# Patient Record
Sex: MALE | Race: WHITE | Employment: FULL TIME | URBAN - METROPOLITAN AREA
[De-identification: names, ages, dates, MRNs, and addresses within clinical notes are randomized per-mention and may not be internally consistent; named-entity substitution may affect disease eponyms.]

---

## 2021-07-01 ENCOUNTER — TELEPHONE (OUTPATIENT)
Dept: ADMINISTRATIVE | Age: 81
End: 2021-07-01

## 2021-07-01 NOTE — TELEPHONE ENCOUNTER
Discussed with Dr. Riley Lobo. Call placed to patient. Appointment scheduled at this time. Directions provided at this time.      Future Appointments   Date Time Provider Rowena Madsen   7/2/2021 11:00 AM SCHEDULE, SE ORTHO RES  [facility]

## 2021-07-02 ENCOUNTER — PREP FOR PROCEDURE (OUTPATIENT)
Dept: ORTHOPEDIC SURGERY | Age: 81
End: 2021-07-02

## 2021-07-02 ENCOUNTER — OFFICE VISIT (OUTPATIENT)
Dept: ORTHOPEDIC SURGERY | Age: 81
End: 2021-07-02
Payer: MEDICARE

## 2021-07-02 VITALS — TEMPERATURE: 97.1 F

## 2021-07-02 DIAGNOSIS — S66.822A LACERATION OF HAND INVOLVING EXTENSOR TENDON, LEFT, INITIAL ENCOUNTER: Primary | ICD-10-CM

## 2021-07-02 DIAGNOSIS — S61.412A LACERATION OF HAND INVOLVING EXTENSOR TENDON, LEFT, INITIAL ENCOUNTER: Primary | ICD-10-CM

## 2021-07-02 PROBLEM — S66.829A LACERATION OF HAND INVOLVING EXTENSOR TENDON: Status: ACTIVE | Noted: 2021-07-02

## 2021-07-02 PROBLEM — S61.419A LACERATION OF HAND INVOLVING EXTENSOR TENDON: Status: ACTIVE | Noted: 2021-07-02

## 2021-07-02 PROCEDURE — 99202 OFFICE O/P NEW SF 15 MIN: CPT

## 2021-07-02 PROCEDURE — 99203 OFFICE O/P NEW LOW 30 MIN: CPT | Performed by: PHYSICIAN ASSISTANT

## 2021-07-02 RX ORDER — SODIUM CHLORIDE 9 MG/ML
25 INJECTION, SOLUTION INTRAVENOUS PRN
Status: CANCELLED | OUTPATIENT
Start: 2021-07-02

## 2021-07-02 RX ORDER — CEPHALEXIN 500 MG/1
500 CAPSULE ORAL 4 TIMES DAILY
COMMUNITY
Start: 2021-06-30 | End: 2021-07-07

## 2021-07-02 RX ORDER — OMEPRAZOLE 40 MG/1
40 CAPSULE, DELAYED RELEASE ORAL DAILY
COMMUNITY
Start: 2021-06-07

## 2021-07-02 RX ORDER — SODIUM CHLORIDE 0.9 % (FLUSH) 0.9 %
5-40 SYRINGE (ML) INJECTION EVERY 12 HOURS SCHEDULED
Status: CANCELLED | OUTPATIENT
Start: 2021-07-02

## 2021-07-02 RX ORDER — SODIUM CHLORIDE 0.9 % (FLUSH) 0.9 %
5-40 SYRINGE (ML) INJECTION PRN
Status: CANCELLED | OUTPATIENT
Start: 2021-07-02

## 2021-07-02 NOTE — PROGRESS NOTES
Orthopaedic H&P Note    Semaj Degroot is a 68 y.o. male, his YOB: 1940 with the following history as recorded in [facility]:      Patient Active Problem List    Diagnosis Date Noted    Laceration of hand involving extensor tendon 07/02/2021     Current Outpatient Medications   Medication Sig Dispense Refill    omeprazole (PRILOSEC) 40 MG delayed release capsule TAKE ONE CAPSULE BY MOUTH DAILY      cephALEXin (KEFLEX) 500 MG capsule Take 500 mg by mouth 4 times daily       Multiple Vitamins-Minerals (CENTRUM ADULTS PO) Take by mouth       No current facility-administered medications for this visit. Allergies: Patient has no known allergies. Past Medical History:   Diagnosis Date    Prostate cancer [facility])      Past Surgical History:   Procedure Laterality Date    HERNIA REPAIR Right     KNEE ARTHROPLASTY Left     ROTATOR CUFF REPAIR Right      History reviewed. No pertinent family history. Social History     Tobacco Use    Smoking status: Never Smoker    Smokeless tobacco: Never Used   Substance Use Topics    Alcohol use: Not Currently                             Chief Complaint   Patient presents with    ED Follow-up     Left hand tendon injury from 6/30/2021. Surgery set up       SUBJECTIVE: Semaj Degroot is here for initial evaluation for their left hand. Patient was using a  and had scraped against the right thumb which made him lose  on the saw which caused a laceration to the dorsum of the hand. He was seen in the ED on 6/30/21 in [place], laceration was repaired and extensor tendon was visualized lacerated per ED report. 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 He is currently on Keflex since injury. He was placed in a finger splint, currently with approximately 20 ° of flexion at MCP of index finger. Pain well controlled. Denies N/T.  No other orthopedic complaints    Review of Systems   Constitutional: Negative for fever, chills, diaphoresis, appetite change and fatigue. HENT: Negative for dental issues, hearing loss and tinnitus. Negative for congestion, sinus pressure, sneezing, sore throat. Negative for headache. Eyes: Negative for visual disturbance, blurred and double vision. Negative for pain, discharge, redness and itching  Respiratory: Negative for cough, shortness of breath and wheezing. Cardiovascular: Negative for chest pain, palpitations and leg swelling. No dyspnea on exertion   Gastrointestinal:   Negative for nausea, vomiting, abdominal pain, diarrhea, constipation  or black or bloody. Hematologic\Lymphatic:  negative for bleeding, petechiae,   Genitourinary: Negative for hematuria and difficulty urinating. Musculoskeletal: Negative for neck pain and stiffness. Mild for back pain, negative joint swelling and gait problem. Skin: Negative for pallor, rash and wound. Neurological: Negative for dizziness, tremors, seizures, weakness, light-headedness, no TIA or stroke symptoms. No numbness and headaches. Psychiatric/Behavioral: Negative.      Physical Examination:   General appearance: alert, well appearing, and in no distress,  normal appearing weight  Mental status: alert, oriented to person, place, and time, normal mood, behavior, speech, dress, motor activity, and thought processes  Abdomen: soft, nondistended   Resp:   resp easy and unlabored, no audible wheezes note  Cardiac: distal pulses palpable, skin well perfused  Neurological: alert, oriented X3, normal speech, no focal findings or movement disorder noted, motor and sensory grossly normal bilaterally, normal muscle tone, no tremors, strength 5/5, normal gait and station  HEENT: normochephalic atraumatic, external ears and eyes normal, sclera normal, neck supple  Extremities:   peripheral pulses normal, no edema, redness or tenderness in the calves   Skin: normal coloration, no rashes or open wounds, no suspicious skin lesions noted  Psych: Affect euthymic   Musculoskeletal:    Extremity:  Left Upper Extremity  Skin is clean dry and intact  Mild edema noted to the dorsum of the hand and index finger  Radial pulse palpable, fingers warm with BCR  Flex/extension intact to wrist, thumb and fingers except the index finger. Subjectively states sensation intact to radial/medial/ulnar distribution  Laceration well approximated with no redness, drainage or warmth, suture intact      Temp 97.1 °F (36.2 °C) (Oral)      ASSESSMENT:     Diagnosis Orders   1. Laceration of hand involving extensor tendon, left, initial encounter         Discussion:  Had lengthy discussion with patient regarding His diagnosis, typical prognosis, and expected outcomes. I reviewed the possible complications from the injury itself despite treatment choosen. I also discussed treatment options including nonoperative managements versus surgical management, along with risks and benefits of each. They have elected for surgical management at this time. Risk, benefits and treatment options discussed with Orvis Section.  he has verbalized understanding of options. The possibility of complications were also discussed to include but not limited to nerve damage, infection, problems with wound healing, vascular injury, chronic pain, stiffness, dysfunction, nonhealing of the bone, symptomatic hardware and/or its failure, need for subsequent surgery, dislocation, and blood clots as well as medical related problems and other problems not specifically discussed. Risk of anesthesia also discussed to include death. Post-op care, work, activity and restrictions which included the use of pain medication and possibility of using blood thinner post op were also discussed with Orvis Section and he verbalized and agreed with the restrictions.      PLAN:  Plan for OR on 7/8/2021 with Dr. Tomasa Martínez MD for Left index finger extensor tendon repair  Pre-surgery medical clearance is not needed  NPO after midnight the night before surgery  NWB on left upper extremity  Splint care instructed with return precautions  Hold aspirin the morning of your surgery  Pre-op COVID-19 testing not indicated, patient vaccinated      Electronically signed by Saeed Ziegler PA-C on 7/2/2021 at 11:54 AM  Note: This report was completed using [employer] voiced recognition software. Every effort has been made to ensure accuracy; however, inadvertent computerized transcription errors may be present.

## 2021-07-02 NOTE — PATIENT INSTRUCTIONS
1. Your surgery is scheduled for Left Extensor Tendon Repair on Thursday 7/8/2021 at 1:00 PM  with Dr. Gema Lopez MD at the main [address] [place] [address] on [facility] . You will need to report to Preop area  that morning at 11:00 AM      2. You are having Outpatient surgery so you will be returning home the same day  3. Preadmission Testing (PAT) department at [facility] will contact you with all the details prior to surgery. 4. Nothing to eat or drink after midnight the night before surgery. You may take a pain pill and any other medicine PAT instructs you to take with small sip of water if needed. 5. Keep splint clean and dry. Do not remove or get wet. OK to remove for dressing changes only if needed  6. Continue with ice and elevation to reduce swelling. Make sure to keep the hand elevated above the heart when able  7. No weight bearing through the left hand  8. Take pain medicine as instructed  9. Call office with any question or concerns: [phone]. Hold Aspirin  the day of surgery. Hold all NSAIDs 7 days prior to surgery    Finish All Antibiotics as prescribed    Bring your covid vaccination cards with you the morning of surgery    All surgical patients will be gradually tapered off narcotic pain medication post operatively, this office will not continue narcotics longer than 6 weeks from date of surgery. If narcotics are required longer than this time period without objective finding you will be referred to pain management.

## 2021-07-02 NOTE — H&P
Orthopaedic H&P Note     Caio Peralta is a 68 y.o. male, his YOB: 1940 with the following history as recorded in [facility]:             Patient Active Problem List     Diagnosis Date Noted    Laceration of hand involving extensor tendon 07/02/2021      Current Facility-Administered Medications          Current Outpatient Medications   Medication Sig Dispense Refill    omeprazole (PRILOSEC) 40 MG delayed release capsule TAKE ONE CAPSULE BY MOUTH DAILY        cephALEXin (KEFLEX) 500 MG capsule Take 500 mg by mouth 4 times daily         Multiple Vitamins-Minerals (CENTRUM ADULTS PO) Take by mouth          No current facility-administered medications for this visit.         Allergies: Patient has no known allergies. Past Medical History        Past Medical History:   Diagnosis Date    Prostate cancer [facility])           Past Surgical History         Past Surgical History:   Procedure Laterality Date    HERNIA REPAIR Right      KNEE ARTHROPLASTY Left      ROTATOR CUFF REPAIR Right           Family History   History reviewed. No pertinent family history. Social History           Tobacco Use    Smoking status: Never Smoker    Smokeless tobacco: Never Used   Substance Use Topics    Alcohol use: Not Currently                               Chief Complaint   Patient presents with    ED Follow-up       Left hand tendon injury from 6/30/2021. Surgery set up         SUBJECTIVE: Caio Peralta is here for initial evaluation for their left hand. Patient was using a  and had scraped against the right thumb which made him lose  on the saw which caused a laceration to the dorsum of the hand. He was seen in the ED on 6/30/21 in [place], laceration was repaired and extensor tendon was visualized lacerated per ED report. 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 He is currently on Keflex since injury.  He was placed in a finger splint, supple  Extremities:   peripheral pulses normal, no edema, redness or tenderness in the calves   Skin: normal coloration, no rashes or open wounds, no suspicious skin lesions noted  Psych: Affect euthymic   Musculoskeletal:    Extremity:  Left Upper Extremity  Skin is clean dry and intact  Mild edema noted to the dorsum of the hand and index finger  Radial pulse palpable, fingers warm with BCR  Flex/extension intact to wrist, thumb and fingers except the index finger. Subjectively states sensation intact to radial/medial/ulnar distribution  Laceration well approximated with no redness, drainage or warmth, suture intact        Temp 97.1 °F (36.2 °C) (Oral)      ASSESSMENT:       Diagnosis Orders   1. Laceration of hand involving extensor tendon, left, initial encounter            Discussion:  Had lengthy discussion with patient regarding His diagnosis, typical prognosis, and expected outcomes. I reviewed the possible complications from the injury itself despite treatment choosen. I also discussed treatment options including nonoperative managements versus surgical management, along with risks and benefits of each. They have elected for surgical management at this time.       Risk, benefits and treatment options discussed with Sharad Stanley.  he has verbalized understanding of options. The possibility of complications were also discussed to include but not limited to nerve damage, infection, problems with wound healing, vascular injury, chronic pain, stiffness, dysfunction, nonhealing of the bone, symptomatic hardware and/or its failure, need for subsequent surgery, dislocation, and blood clots as well as medical related problems and other problems not specifically discussed. Risk of anesthesia also discussed to include death.    Post-op care, work, activity and restrictions which included the use of pain medication and possibility of using blood thinner post op were also discussed with Sharad Stanley and he verbalized and agreed with the restrictions.      PLAN:  Plan for OR on 7/8/2021 with Dr. Shawn Cabral MD for Left index finger extensor tendon repair  Pre-surgery medical clearance is not needed  NPO after midnight the night before surgery  NWB on left upper extremity  Splint care instructed with return precautions  Hold aspirin the morning of your surgery  Pre-op COVID-19 testing not indicated, patient vaccinated        Electronically signed by Gianna Young PA-C on 7/2/2021 at 11:54 AM  Note: This report was completed using [employer] voiced recognition [employer] effort has been made to ensure accuracy; however, inadvertent computerized transcription errors may be present. Office Visit on 7/2/2021     Office Visit on 7/2/2021        Detailed Report      Note shared with patient  Progress Notes Info    Author Note Status Last Update User   Gianna Yonug PA-C Signed Gianna Young PA-C   Last Update Date/Time: 7/2/2021 12:05 PM   Chart Review Routing History    No routing history on file.

## 2021-07-02 NOTE — H&P (VIEW-ONLY)
Orthopaedic H&P Note     Karmen Cadet is a 68 y.o. male, his YOB: 1940 with the following history as recorded in [facility]:             Patient Active Problem List     Diagnosis Date Noted    Laceration of hand involving extensor tendon 07/02/2021      Current Facility-Administered Medications          Current Outpatient Medications   Medication Sig Dispense Refill    omeprazole (PRILOSEC) 40 MG delayed release capsule TAKE ONE CAPSULE BY MOUTH DAILY        cephALEXin (KEFLEX) 500 MG capsule Take 500 mg by mouth 4 times daily         Multiple Vitamins-Minerals (CENTRUM ADULTS PO) Take by mouth          No current facility-administered medications for this visit.         Allergies: Patient has no known allergies. Past Medical History        Past Medical History:   Diagnosis Date    Prostate cancer [facility])           Past Surgical History         Past Surgical History:   Procedure Laterality Date    HERNIA REPAIR Right      KNEE ARTHROPLASTY Left      ROTATOR CUFF REPAIR Right           Family History   History reviewed. No pertinent family history. Social History           Tobacco Use    Smoking status: Never Smoker    Smokeless tobacco: Never Used   Substance Use Topics    Alcohol use: Not Currently                               Chief Complaint   Patient presents with    ED Follow-up       Left hand tendon injury from 6/30/2021. Surgery set up         SUBJECTIVE: Karmen Cadet is here for initial evaluation for their left hand. Patient was using a  and had scraped against the right thumb which made him lose  on the saw which caused a laceration to the dorsum of the hand. He was seen in the ED on 6/30/21 in [place], laceration was repaired and extensor tendon was visualized lacerated per ED report. 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 He is currently on Keflex since injury.  He was placed in a finger splint, currently with approximately 20 ° of flexion at MCP of index finger. Pain well controlled. Denies N/T. No other orthopedic complaints     Review of Systems   Constitutional: Negative for fever, chills, diaphoresis, appetite change and fatigue. HENT: Negative for dental issues, hearing loss and tinnitus. Negative for congestion, sinus pressure, sneezing, sore throat. Negative for headache. Eyes: Negative for visual disturbance, blurred and double vision. Negative for pain, discharge, redness and itching  Respiratory: Negative for cough, shortness of breath and wheezing. Cardiovascular: Negative for chest pain, palpitations and leg swelling. No dyspnea on exertion   Gastrointestinal:   Negative for nausea, vomiting, abdominal pain, diarrhea, constipation  or black or bloody. Hematologic\Lymphatic:  negative for bleeding, petechiae,   Genitourinary: Negative for hematuria and difficulty urinating. Musculoskeletal: Negative for neck pain and stiffness. Mild for back pain, negative joint swelling and gait problem. Skin: Negative for pallor, rash and wound. Neurological: Negative for dizziness, tremors, seizures, weakness, light-headedness, no TIA or stroke symptoms. No numbness and headaches.    Psychiatric/Behavioral: Negative.      Physical Examination:   General appearance: alert, well appearing, and in no distress,  normal appearing weight  Mental status: alert, oriented to person, place, and time, normal mood, behavior, speech, dress, motor activity, and thought processes  Abdomen: soft, nondistended   Resp:   resp easy and unlabored, no audible wheezes note  Cardiac: distal pulses palpable, skin well perfused  Neurological: alert, oriented X3, normal speech, no focal findings or movement disorder noted, motor and sensory grossly normal bilaterally, normal muscle tone, no tremors, strength 5/5, normal gait and station  HEENT: normochephalic atraumatic, external ears and eyes normal, sclera normal, neck supple  Extremities:   peripheral pulses normal, no edema, redness or tenderness in the calves   Skin: normal coloration, no rashes or open wounds, no suspicious skin lesions noted  Psych: Affect euthymic   Musculoskeletal:    Extremity:  Left Upper Extremity  Skin is clean dry and intact  Mild edema noted to the dorsum of the hand and index finger  Radial pulse palpable, fingers warm with BCR  Flex/extension intact to wrist, thumb and fingers except the index finger. Subjectively states sensation intact to radial/medial/ulnar distribution  Laceration well approximated with no redness, drainage or warmth, suture intact        Temp 97.1 °F (36.2 °C) (Oral)      ASSESSMENT:       Diagnosis Orders   1. Laceration of hand involving extensor tendon, left, initial encounter            Discussion:  Had lengthy discussion with patient regarding His diagnosis, typical prognosis, and expected outcomes. I reviewed the possible complications from the injury itself despite treatment choosen. I also discussed treatment options including nonoperative managements versus surgical management, along with risks and benefits of each. They have elected for surgical management at this time.       Risk, benefits and treatment options discussed with Agatha Mensah.  he has verbalized understanding of options. The possibility of complications were also discussed to include but not limited to nerve damage, infection, problems with wound healing, vascular injury, chronic pain, stiffness, dysfunction, nonhealing of the bone, symptomatic hardware and/or its failure, need for subsequent surgery, dislocation, and blood clots as well as medical related problems and other problems not specifically discussed. Risk of anesthesia also discussed to include death.    Post-op care, work, activity and restrictions which included the use of pain medication and possibility of using blood thinner post op were also discussed with Agtaha Mensah and he verbalized and agreed with the restrictions.      PLAN:  Plan for OR on 7/8/2021 with Dr. Arin Nicolas MD for Left index finger extensor tendon repair  Pre-surgery medical clearance is not needed  NPO after midnight the night before surgery  NWB on left upper extremity  Splint care instructed with return precautions  Hold aspirin the morning of your surgery  Pre-op COVID-19 testing not indicated, patient vaccinated        Electronically signed by Verito Dillon PA-C on 7/2/2021 at 11:54 AM  Note: This report was completed using [employer] voiced recognition [employer] effort has been made to ensure accuracy; however, inadvertent computerized transcription errors may be present. Office Visit on 7/2/2021     Office Visit on 7/2/2021        Detailed Report      Note shared with patient  Progress Notes Info    Author Note Status Last Update User   Verito Dillon PA-C Signed Verito Dillon PA-C   Last Update Date/Time: 7/2/2021 12:05 PM   Chart Review Routing History    No routing history on file.

## 2021-07-06 RX ORDER — PHENOL 1.4 %
1 AEROSOL, SPRAY (ML) MUCOUS MEMBRANE DAILY
COMMUNITY

## 2021-07-06 RX ORDER — CHOLECALCIFEROL (VITAMIN D3) 125 MCG
5 CAPSULE ORAL NIGHTLY
COMMUNITY

## 2021-07-06 RX ORDER — GLUCOSAM/CHON-MSM1/C/MANG/BOSW 750-644 MG
1 TABLET ORAL DAILY
COMMUNITY

## 2021-07-06 NOTE — PROGRESS NOTES
[facility] PRE-ADMISSION TESTING GENERAL INSTRUCTIONS- [facility]-phone number:[phone]    GENERAL INSTRUCTIONS  [x] Antibacterial Soap shower Night before and/or AM of Surgery    [x] Nothing by mouth after midnight, including gum, candy, mints, or water. [x] You may brush your teeth, gargle, but do NOT swallow water. [x]No smoking, chewing tobacco, illegal drugs, or alcohol within 24 hours of your surgery. [x] Jewelry, valuables or body piercing's should not be brought to the hospital. All body and/or tongue piercing's must be removed prior to arriving to hospital.  ALL hair pins must be removed. [x] Do not wear makeup, lotions, powders, deodorant. Nail polish as directed by the nurse. [x] Arrange transportation with a responsible adult  to and from the hospital. If you do not have a responsible adult  to transport you, you will need to make arrangements with a medical transportation company (i.e. [employer]. A Uber/taxi/bus is not appropriate unless you are accompanied by a responsible adult ). Arrange for someone to be with you for the remainder of the day and for 24 hours after your procedure due to having had anesthesia. Who will be your  for transportation? Edith, daughter  Who will be staying with you for 24 hrs after your procedure? Edith, daughter  [x] Bring insurance card and photo ID. [x] Bring copy of living will or healthcare power of  papers to be placed in your electronic record. PARKING INSTRUCTIONS:   [x] Arrival Time: 11:30 am, you and your  will need to wear a mask. · [x] Parking lot '\"I\"  is located on [facility] (the corner of [facility] and [facility]). To enter, press the button and the gate will lift. A free token will be provided to exit the lot. One car per patient is allowed to park in this lot.  All other cars are to park on [address] [place] [address] either in the parking garage or the handicap lot.      Walk up the front walk to the [facility], the door will be locked an employee will greet you and let you in. One person may accompany you. Wear a mask. Discussed with patient he and wife may come in the [employer]- daughter will drop them off at the [facility] entrance. Patient aware only one family member may come in with him. EDUCATION INSTRUCTIONS:      .         [x]Pain: Post-op pain is normal and to be expected. You will be asked to rate your pain from 0-10 (a zero is not acceptable-education is needed). Your post-op pain goal is:  [x] Ask your nurse for your pain medication. MEDICATION INSTRUCTIONS:  [x]Bring a complete list of your medications, please write the last time you took the medicine, give this list to the nurse. [x] Take the following medications the morning of surgery with 1-2 ounces of water: omeprazole  [x] Stop herbal supplements and vitamins 5 days before your surgery. [x] Follow physician instructions regarding any blood thinners you may be taking. WHAT TO EXPECT:  [x] The day of surgery you will be greeted and checked in by the [employer]. Please bring your photo ID and insurance card. A nurse will greet you in accordance to the time you are needed in the pre-op area to prepare you for surgery. Please do not be discouraged if you are not greeted in the order you arrive as there are many variables that are involved in patient preparation. Your patience is greatly appreciated as you wait for your nurse. Please bring in items such as: books, magazines, newspapers, electronics, or any other items  to occupy your time in the waiting area. [x]  Delays may occur with surgery and staff will make a sincere effort to keep you informed of delays. If any delays occur with your procedure, we apologize ahead of time for your inconvenience as we recognize the value of your time.

## 2021-07-07 ENCOUNTER — ANESTHESIA EVENT (OUTPATIENT)
Dept: OPERATING ROOM | Age: 81
End: 2021-07-07
Payer: MEDICARE

## 2021-07-08 ENCOUNTER — ANESTHESIA (OUTPATIENT)
Dept: OPERATING ROOM | Age: 81
End: 2021-07-08
Payer: MEDICARE

## 2021-07-08 ENCOUNTER — HOSPITAL ENCOUNTER (OUTPATIENT)
Age: 81
Setting detail: OUTPATIENT SURGERY
Discharge: HOME | End: 2021-07-08
Attending: ORTHOPAEDIC SURGERY | Admitting: ORTHOPAEDIC SURGERY
Payer: MEDICARE

## 2021-07-08 VITALS
HEART RATE: 60 BPM | BODY MASS INDEX: 27.5 KG/M2 | RESPIRATION RATE: 16 BRPM | WEIGHT: 203 LBS | SYSTOLIC BLOOD PRESSURE: 142 MMHG | DIASTOLIC BLOOD PRESSURE: 68 MMHG | TEMPERATURE: 97 F | HEIGHT: 72 IN | OXYGEN SATURATION: 96 %

## 2021-07-08 VITALS — DIASTOLIC BLOOD PRESSURE: 97 MMHG | OXYGEN SATURATION: 98 % | SYSTOLIC BLOOD PRESSURE: 139 MMHG

## 2021-07-08 DIAGNOSIS — S61.412D LACERATION OF HAND INVOLVING EXTENSOR TENDON, LEFT, SUBSEQUENT ENCOUNTER: ICD-10-CM

## 2021-07-08 DIAGNOSIS — Z01.818 PRE-OP TESTING: Primary | ICD-10-CM

## 2021-07-08 DIAGNOSIS — S66.822D LACERATION OF HAND INVOLVING EXTENSOR TENDON, LEFT, SUBSEQUENT ENCOUNTER: ICD-10-CM

## 2021-07-08 LAB
EKG ATRIAL RATE: 68 BPM
EKG P AXIS: 20 DEGREES
EKG P-R INTERVAL: 160 MS
EKG Q-T INTERVAL: 428 MS
EKG QRS DURATION: 100 MS
EKG QTC CALCULATION (BAZETT): 455 MS
EKG R AXIS: 39 DEGREES
EKG T AXIS: 33 DEGREES
EKG VENTRICULAR RATE: 68 BPM

## 2021-07-08 PROCEDURE — 2580000003 HC RX 258: Performed by: PHYSICIAN ASSISTANT

## 2021-07-08 PROCEDURE — 3700000000 HC ANESTHESIA ATTENDED CARE: Performed by: ORTHOPAEDIC SURGERY

## 2021-07-08 PROCEDURE — 7100000001 HC PACU RECOVERY - ADDTL 15 MIN: Performed by: ORTHOPAEDIC SURGERY

## 2021-07-08 PROCEDURE — 3700000001 HC ADD 15 MINUTES (ANESTHESIA): Performed by: ORTHOPAEDIC SURGERY

## 2021-07-08 PROCEDURE — 13132 CMPLX RPR F/C/C/M/N/AX/G/H/F: CPT | Performed by: ORTHOPAEDIC SURGERY

## 2021-07-08 PROCEDURE — 93010 ELECTROCARDIOGRAM REPORT: CPT | Performed by: INTERNAL MEDICINE

## 2021-07-08 PROCEDURE — 3600000003 HC SURGERY LEVEL 3 BASE: Performed by: ORTHOPAEDIC SURGERY

## 2021-07-08 PROCEDURE — 26410 REPAIR HAND TENDON: CPT | Performed by: ORTHOPAEDIC SURGERY

## 2021-07-08 PROCEDURE — 7100000000 HC PACU RECOVERY - FIRST 15 MIN: Performed by: ORTHOPAEDIC SURGERY

## 2021-07-08 PROCEDURE — 6360000002 HC RX W HCPCS: Performed by: ANESTHESIOLOGY

## 2021-07-08 PROCEDURE — 7100000010 HC PHASE II RECOVERY - FIRST 15 MIN: Performed by: ORTHOPAEDIC SURGERY

## 2021-07-08 PROCEDURE — 3600000013 HC SURGERY LEVEL 3 ADDTL 15MIN: Performed by: ORTHOPAEDIC SURGERY

## 2021-07-08 PROCEDURE — 6360000002 HC RX W HCPCS

## 2021-07-08 PROCEDURE — 6360000002 HC RX W HCPCS: Performed by: PHYSICIAN ASSISTANT

## 2021-07-08 PROCEDURE — 2500000003 HC RX 250 WO HCPCS

## 2021-07-08 PROCEDURE — 93005 ELECTROCARDIOGRAM TRACING: CPT | Performed by: PHYSICIAN ASSISTANT

## 2021-07-08 PROCEDURE — 2709999900 HC NON-CHARGEABLE SUPPLY: Performed by: ORTHOPAEDIC SURGERY

## 2021-07-08 PROCEDURE — 7100000011 HC PHASE II RECOVERY - ADDTL 15 MIN: Performed by: ORTHOPAEDIC SURGERY

## 2021-07-08 PROCEDURE — 6370000000 HC RX 637 (ALT 250 FOR IP): Performed by: ANESTHESIOLOGY

## 2021-07-08 RX ORDER — GLYCOPYRROLATE 1 MG/5 ML
SYRINGE (ML) INTRAVENOUS PRN
Status: DISCONTINUED | OUTPATIENT
Start: 2021-07-08 | End: 2021-07-08 | Stop reason: SDUPTHER

## 2021-07-08 RX ORDER — MORPHINE SULFATE 2 MG/ML
2 INJECTION, SOLUTION INTRAMUSCULAR; INTRAVENOUS EVERY 5 MIN PRN
Status: DISCONTINUED | OUTPATIENT
Start: 2021-07-08 | End: 2021-07-08 | Stop reason: HOSPADM

## 2021-07-08 RX ORDER — DEXAMETHASONE SODIUM PHOSPHATE 10 MG/ML
INJECTION INTRAMUSCULAR; INTRAVENOUS PRN
Status: DISCONTINUED | OUTPATIENT
Start: 2021-07-08 | End: 2021-07-08 | Stop reason: SDUPTHER

## 2021-07-08 RX ORDER — ROCURONIUM BROMIDE 10 MG/ML
INJECTION, SOLUTION INTRAVENOUS PRN
Status: DISCONTINUED | OUTPATIENT
Start: 2021-07-08 | End: 2021-07-08 | Stop reason: SDUPTHER

## 2021-07-08 RX ORDER — ONDANSETRON 2 MG/ML
INJECTION INTRAMUSCULAR; INTRAVENOUS PRN
Status: DISCONTINUED | OUTPATIENT
Start: 2021-07-08 | End: 2021-07-08 | Stop reason: SDUPTHER

## 2021-07-08 RX ORDER — SODIUM CHLORIDE 0.9 % (FLUSH) 0.9 %
5-40 SYRINGE (ML) INJECTION EVERY 12 HOURS SCHEDULED
Status: DISCONTINUED | OUTPATIENT
Start: 2021-07-08 | End: 2021-07-08 | Stop reason: HOSPADM

## 2021-07-08 RX ORDER — OXYCODONE HYDROCHLORIDE AND ACETAMINOPHEN 5; 325 MG/1; MG/1
1 TABLET ORAL PRN
Status: COMPLETED | OUTPATIENT
Start: 2021-07-08 | End: 2021-07-08

## 2021-07-08 RX ORDER — FENTANYL CITRATE 50 UG/ML
INJECTION, SOLUTION INTRAMUSCULAR; INTRAVENOUS PRN
Status: DISCONTINUED | OUTPATIENT
Start: 2021-07-08 | End: 2021-07-08 | Stop reason: SDUPTHER

## 2021-07-08 RX ORDER — PROPOFOL 10 MG/ML
INJECTION, EMULSION INTRAVENOUS PRN
Status: DISCONTINUED | OUTPATIENT
Start: 2021-07-08 | End: 2021-07-08 | Stop reason: SDUPTHER

## 2021-07-08 RX ORDER — SODIUM CHLORIDE 0.9 % (FLUSH) 0.9 %
5-40 SYRINGE (ML) INJECTION PRN
Status: DISCONTINUED | OUTPATIENT
Start: 2021-07-08 | End: 2021-07-08 | Stop reason: HOSPADM

## 2021-07-08 RX ORDER — LIDOCAINE HYDROCHLORIDE 20 MG/ML
INJECTION, SOLUTION INTRAVENOUS PRN
Status: DISCONTINUED | OUTPATIENT
Start: 2021-07-08 | End: 2021-07-08 | Stop reason: SDUPTHER

## 2021-07-08 RX ORDER — ACETAMINOPHEN 500 MG
1000 TABLET ORAL ONCE
Status: CANCELLED | OUTPATIENT
Start: 2021-07-08 | End: 2021-07-08

## 2021-07-08 RX ORDER — MEPERIDINE HYDROCHLORIDE 25 MG/ML
12.5 INJECTION INTRAMUSCULAR; INTRAVENOUS; SUBCUTANEOUS
Status: DISCONTINUED | OUTPATIENT
Start: 2021-07-08 | End: 2021-07-08 | Stop reason: HOSPADM

## 2021-07-08 RX ORDER — MORPHINE SULFATE 2 MG/ML
1 INJECTION, SOLUTION INTRAMUSCULAR; INTRAVENOUS EVERY 5 MIN PRN
Status: DISCONTINUED | OUTPATIENT
Start: 2021-07-08 | End: 2021-07-08 | Stop reason: HOSPADM

## 2021-07-08 RX ORDER — ONDANSETRON 2 MG/ML
4 INJECTION INTRAMUSCULAR; INTRAVENOUS
Status: DISCONTINUED | OUTPATIENT
Start: 2021-07-08 | End: 2021-07-08 | Stop reason: HOSPADM

## 2021-07-08 RX ORDER — HYDROCODONE BITARTRATE AND ACETAMINOPHEN 5; 325 MG/1; MG/1
1 TABLET ORAL EVERY 6 HOURS PRN
Qty: 28 TABLET | Refills: 0 | Status: SHIPPED | OUTPATIENT
Start: 2021-07-08 | End: 2021-07-15

## 2021-07-08 RX ORDER — SODIUM CHLORIDE 9 MG/ML
25 INJECTION, SOLUTION INTRAVENOUS PRN
Status: DISCONTINUED | OUTPATIENT
Start: 2021-07-08 | End: 2021-07-08 | Stop reason: HOSPADM

## 2021-07-08 RX ORDER — OXYCODONE HYDROCHLORIDE AND ACETAMINOPHEN 5; 325 MG/1; MG/1
2 TABLET ORAL PRN
Status: COMPLETED | OUTPATIENT
Start: 2021-07-08 | End: 2021-07-08

## 2021-07-08 RX ORDER — NEOSTIGMINE METHYLSULFATE 1 MG/ML
INJECTION, SOLUTION INTRAVENOUS PRN
Status: DISCONTINUED | OUTPATIENT
Start: 2021-07-08 | End: 2021-07-08 | Stop reason: SDUPTHER

## 2021-07-08 RX ADMIN — MORPHINE SULFATE 2 MG: 2 INJECTION, SOLUTION INTRAMUSCULAR; INTRAVENOUS at 15:55

## 2021-07-08 RX ADMIN — HYDROMORPHONE HYDROCHLORIDE 0.25 MG: 1 INJECTION, SOLUTION INTRAMUSCULAR; INTRAVENOUS; SUBCUTANEOUS at 14:50

## 2021-07-08 RX ADMIN — SODIUM CHLORIDE 25 ML: 9 INJECTION, SOLUTION INTRAVENOUS at 12:09

## 2021-07-08 RX ADMIN — DEXAMETHASONE SODIUM PHOSPHATE 10 MG: 10 INJECTION INTRAMUSCULAR; INTRAVENOUS at 13:29

## 2021-07-08 RX ADMIN — Medication 3 MG: at 14:09

## 2021-07-08 RX ADMIN — FENTANYL CITRATE 25 MCG: 50 INJECTION, SOLUTION INTRAMUSCULAR; INTRAVENOUS at 14:08

## 2021-07-08 RX ADMIN — ONDANSETRON HYDROCHLORIDE 4 MG: 2 INJECTION, SOLUTION INTRAMUSCULAR; INTRAVENOUS at 14:09

## 2021-07-08 RX ADMIN — PROPOFOL 150 MG: 10 INJECTION, EMULSION INTRAVENOUS at 13:25

## 2021-07-08 RX ADMIN — Medication 0.6 MG: at 14:09

## 2021-07-08 RX ADMIN — LIDOCAINE HYDROCHLORIDE 100 MG: 20 INJECTION, SOLUTION INTRAVENOUS at 13:25

## 2021-07-08 RX ADMIN — Medication 2000 MG: at 13:29

## 2021-07-08 RX ADMIN — SODIUM CHLORIDE: 9 INJECTION, SOLUTION INTRAVENOUS at 14:10

## 2021-07-08 RX ADMIN — ROCURONIUM BROMIDE 40 MG: 10 INJECTION, SOLUTION INTRAVENOUS at 13:25

## 2021-07-08 RX ADMIN — FENTANYL CITRATE 25 MCG: 50 INJECTION, SOLUTION INTRAMUSCULAR; INTRAVENOUS at 13:44

## 2021-07-08 RX ADMIN — OXYCODONE AND ACETAMINOPHEN 1 TABLET: 5; 325 TABLET ORAL at 15:55

## 2021-07-08 RX ADMIN — FENTANYL CITRATE 50 MCG: 50 INJECTION, SOLUTION INTRAMUSCULAR; INTRAVENOUS at 13:25

## 2021-07-08 ASSESSMENT — PULMONARY FUNCTION TESTS
PIF_VALUE: 0
PIF_VALUE: 18
PIF_VALUE: 21
PIF_VALUE: 1
PIF_VALUE: 5
PIF_VALUE: 20
PIF_VALUE: 21
PIF_VALUE: 20
PIF_VALUE: 21
PIF_VALUE: 1
PIF_VALUE: 27
PIF_VALUE: 21
PIF_VALUE: 29
PIF_VALUE: 20
PIF_VALUE: 21
PIF_VALUE: 1
PIF_VALUE: 21
PIF_VALUE: 20
PIF_VALUE: 21
PIF_VALUE: 0
PIF_VALUE: 21
PIF_VALUE: 20
PIF_VALUE: 0
PIF_VALUE: 25
PIF_VALUE: 21
PIF_VALUE: 22
PIF_VALUE: 2
PIF_VALUE: 0
PIF_VALUE: 20
PIF_VALUE: 14
PIF_VALUE: 21
PIF_VALUE: 21
PIF_VALUE: 2
PIF_VALUE: 5
PIF_VALUE: 21
PIF_VALUE: 21
PIF_VALUE: 1
PIF_VALUE: 21
PIF_VALUE: 16
PIF_VALUE: 1
PIF_VALUE: 21
PIF_VALUE: 5
PIF_VALUE: 21
PIF_VALUE: 21
PIF_VALUE: 20
PIF_VALUE: 1
PIF_VALUE: 20
PIF_VALUE: 1
PIF_VALUE: 21
PIF_VALUE: 19
PIF_VALUE: 21
PIF_VALUE: 1
PIF_VALUE: 21
PIF_VALUE: 1
PIF_VALUE: 21

## 2021-07-08 ASSESSMENT — PAIN SCALES - GENERAL
PAINLEVEL_OUTOF10: 5
PAINLEVEL_OUTOF10: 4
PAINLEVEL_OUTOF10: 0
PAINLEVEL_OUTOF10: 6
PAINLEVEL_OUTOF10: 0

## 2021-07-08 ASSESSMENT — PAIN DESCRIPTION - FREQUENCY: FREQUENCY: CONTINUOUS

## 2021-07-08 ASSESSMENT — PAIN DESCRIPTION - PAIN TYPE
TYPE: SURGICAL PAIN

## 2021-07-08 ASSESSMENT — PAIN DESCRIPTION - ONSET: ONSET: ON-GOING

## 2021-07-08 ASSESSMENT — PAIN DESCRIPTION - ORIENTATION
ORIENTATION: LEFT

## 2021-07-08 ASSESSMENT — PAIN DESCRIPTION - PROGRESSION: CLINICAL_PROGRESSION: GRADUALLY WORSENING

## 2021-07-08 ASSESSMENT — PAIN DESCRIPTION - DESCRIPTORS: DESCRIPTORS: ACHING;DISCOMFORT

## 2021-07-08 ASSESSMENT — PAIN DESCRIPTION - LOCATION
LOCATION: HAND

## 2021-07-08 ASSESSMENT — PAIN - FUNCTIONAL ASSESSMENT: PAIN_FUNCTIONAL_ASSESSMENT: 0-10

## 2021-07-08 NOTE — PROGRESS NOTES
Patient admitted to [facility]. Placed on appropriate monitors. Dressing checked Assisted with liquids and nutrition. Call light at bedside. Family at bedside.

## 2021-07-08 NOTE — ANESTHESIA PRE PROCEDURE
Department of Anesthesiology  Preprocedure Note       Name:  Kim Gibbs   Age:  68 y.o.  :  1940                                          MRN:  [id number]         Date:  2021      Surgeon: Dwayne Marin):  Abelardo Rendon MD    Procedure: Procedure(s):  LEFT HAND EXPLORATION AND EXTENSOR TENDON REPAIR    Medications prior to admission:   Prior to Admission medications    Medication Sig Start Date End Date Taking?  Authorizing Provider   calcium carbonate 600 MG TABS tablet Take 1 tablet by mouth daily   Yes Historical Provider, MD   melatonin 5 MG TABS tablet Take 5 mg by mouth nightly   Yes Historical Provider, MD   Misc Natural Products (OSTEO BI-FLEX ADV TRIPLE ST) TABS Take 1 tablet by mouth daily   Yes Historical Provider, MD   omeprazole (PRILOSEC) 40 MG delayed release capsule 40 mg daily  21  Yes Historical Provider, MD   Multiple Vitamins-Minerals (CENTRUM ADULTS PO) Take by mouth    Historical Provider, MD       Current medications:    Current Facility-Administered Medications   Medication Dose Route Frequency Provider Last Rate Last Admin    0.9 % sodium chloride infusion  25 mL Intravenous PRN JOEL Samaniego 100 mL/hr at 21 1209 25 mL at 21 1209    ceFAZolin (ANCEF) 2000 mg in sterile water 20 mL IV syringe  2,000 mg Intravenous On Call to [address] [place], [place]        sodium chloride flush 0.9 % injection 5-40 mL  5-40 mL Intravenous 2 times per day JOEL Samaniego        sodium chloride flush 0.9 % injection 5-40 mL  5-40 mL Intravenous PRN JOEL Samaniego           Allergies:  No Known Allergies    Problem List:    Patient Active Problem List   Diagnosis Code    Laceration of hand involving extensor tendon L62.231T, N43.676R       Past Medical History:        Diagnosis Date    Prostate cancer ([facility] [address] approx 2017       Past Surgical History:        Procedure Laterality Date    HERNIA REPAIR Right     KNEE ARTHROPLASTY Left     ROTATOR CUFF REPAIR Right        Social History:    Social History     Tobacco Use    Smoking status: Never Smoker    Smokeless tobacco: Never Used   Substance Use Topics    Alcohol use: Not Currently                                Counseling given: Not Answered      Vital Signs (Current):   Vitals:    07/06/21 1356 07/08/21 1145   BP:  (!) 161/77   Pulse:  77   Resp:  18   Temp:  97.4 °F (36.3 °C)   TempSrc:  Temporal   SpO2:  95%   Weight: 203 lb (92.1 kg) 203 lb (92.1 kg)   Height: 6' (1.829 m) 6' (1.829 m)                                              BP Readings from Last 3 Encounters:   07/08/21 (!) 161/77       NPO Status: Time of last liquid consumption: 2359                        Time of last solid consumption: 2330                        Date of last liquid consumption: 07/07/21                        Date of last solid food consumption: 07/07/21    BMI:   Wt Readings from Last 3 Encounters:   07/08/21 203 lb (92.1 kg)     Body mass index is 27.53 kg/m². CBC: No results found for: WBC, RBC, HGB, HCT, MCV, RDW, PLT    CMP: No results found for: NA, K, CL, CO2, BUN, CREATININE, GFRAA, AGRATIO, LABGLOM, GLUCOSE, PROT, CALCIUM, BILITOT, ALKPHOS, AST, ALT    POC Tests: No results for input(s): POCGLU, POCNA, POCK, POCCL, POCBUN, POCHEMO, POCHCT in the last 72 hours.     Coags: No results found for: PROTIME, INR, APTT    HCG (If Applicable): No results found for: PREGTESTUR, PREGSERUM, HCG, HCGQUANT     ABGs: No results found for: PHART, PO2ART, NVB8MEJ, NSI2EJC, BEART, A0ODNTGT     Type & Screen (If Applicable):  No results found for: LABABO, LABRH    Drug/Infectious Status (If Applicable):  No results found for: HIV, HEPCAB    COVID-19 Screening (If Applicable): No results found for: COVID19        Anesthesia Evaluation  Patient summary reviewed and Nursing notes reviewed no history of anesthetic complications:   Airway: Mallampati: II  TM distance: >3 FB   Neck ROM: full  Mouth opening: > = 3 FB Dental:      Comment: None loose    Pulmonary:normal exam  breath sounds clear to auscultation                             Cardiovascular:  Exercise tolerance: good (>4 METS),           Rhythm: regular  Rate: normal                    Neuro/Psych:   Negative Neuro/Psych ROS              GI/Hepatic/Renal: Neg GI/Hepatic/Renal ROS            Endo/Other:                      ROS comment: Prostate Ca    Hand laceration Abdominal:             Vascular: negative vascular ROS. Other Findings:             Anesthesia Plan      MAC     ASA 2       Induction: intravenous. MIPS: Prophylactic antiemetics administered. Anesthetic plan and risks discussed with patient. Plan discussed with CRNA.                   Oswaldo Reyna DO   7/8/2021

## 2021-07-08 NOTE — INTERVAL H&P NOTE
Update History & Physical    The patient's History and Physical of July 2, 2021 was reviewed with the patient and I examined the patient. There was no change. The surgical site was confirmed by the patient and me. Plan: The risks, benefits, expected outcome, and alternative to the recommended procedure have been discussed with the patient. Patient understands and wants to proceed with the procedure.      Electronically signed by Ho Velasquez MD on 7/8/2021 at 12:41 PM

## 2021-07-08 NOTE — OP NOTE
Operative Note      Patient: Seda Burnette  YOB: 1940  MRN: [id number]    Date of Procedure: 7/8/2021    Pre-Op Diagnosis:   1.  4 cm laceration dorsum of left hand    2. Laceration extensor tendons left second digit    Post-Op Diagnosis: Same         Procedure:  1. Repair of extensor indices to second digit left hand    2. Repair of extensor indices proprius left hand    3. Complex repair of laceration, 4 cm in length, left hand        Surgeon(s):  Courtney oNriega MD    Assistant:   Resident: Sarah Zepeda DO; Albino Rangel DO    Anesthesia: General    Estimated Blood Loss (mL): Minimal    Complications: None    Specimens:   * No specimens in log *    Implants:  * No implants in log *      Drains: * No LDAs found *    Findings: Good repair with good tenodesis post repair, no gapping of repair    Detailed Description of Procedure:   Patient was brought to the operating room in a supine position on the hospital room bed. Patient was transferred to the operating room table by multiple individuals in a safe fashion with anesthesia and control patient C-spine area. Once in the operating room table applies pressure identified well-padded. Armboard was brought to his left side. His left upper extremity sterilely prepped and draped in the standard orthopedic fashion. A timeout was performed indicating the appropriate identification of the patient, the procedure to be performed, and the side to be performed upon. This was agreed upon by all individuals in room. At that time was performed and Esmarch was applied tourniquet was elevated to 250 mmHg. The 4 cm laceration was directly over where the extensors to the second digit would be. This is extended in a Z-type fashion both proximally and distally. The skin edges were debrided as well subcutaneous fat and fascia. This was debrided in excisional debridement fashion with the scalpel.   We then sutured the skin flaps back to expose the area of the tendon laceration. We were able to find the proximal edge of the extensor indices proprius as well as the extensor indices. We then found these distally dissected around them. The wound was then bhavin irrigated with sterile normal saline with a bulb syringe. Once irrigated we then put 4-0 FiberWire loop suture to create a locking running suture in both the proximal and distal end of the extensor indices. These were then tied to make a good repair. Once these were tied this process was repeated for the extensor indices proprius. This was also tied down securely and repaired. At this point time a good tenodesis and no gapping repair. We then utilized 5-0 Prolene to overrun both repairs to smooth down the tendon edges. At this point time the wound was bhavin irrigated once again. The tourniquet was let down to 0 mmHg no bleeding controlled electrocautery. The previously opened portion was then closed with 3-0 nylon. The surgically created ports were closed with 2-0 Monocryl and 3-0 nylon. Compartments are soft and compressible. Patient has sterile dressing put in position as well as a resting hand splint. Patient was worse in anesthesia without complication taken to the PACU in stable condition. Postoperative plan:  Keep left hand splinted clean and dry for the next 2 weeks    Follow-up in the office in 2 weeks for suture removal we will start appropriate guidelines for extensor tendon rehabilitation    Call sooner with any questions or concerns.     Electronically signed by Ely Mojica MD on 7/8/2021 at 2:04 PM

## 2021-07-11 NOTE — ANESTHESIA POSTPROCEDURE EVALUATION
Department of Anesthesiology  Postprocedure Note    Patient: Dyllan Ewing  MRN: [id number]  YOB: 1940  Date of evaluation: 7/11/2021  Time:  8:51 AM     Procedure Summary     Date: 07/08/21 Room / Location: [facility] OR 09 / [facility]    Anesthesia Start: 1320 Anesthesia Stop: 0497    Procedure: LEFT HAND EXPLORATION AND EXTENSOR TENDON REPAIR (Left Hand) Diagnosis: (LEFT HAND EXTENSOR TENDON LACERATION)    Surgeons: Claire Murcia MD Responsible Provider: Shruti Smith DO    Anesthesia Type: MAC ASA Status: 2          Anesthesia Type: MAC    Zaid Phase I: Zaid Score: 10    Zaid Phase II: Zaid Score: 10    Last vitals: Reviewed and per EMR flowsheets.        Anesthesia Post Evaluation    Patient location during evaluation: PACU  Patient participation: complete - patient participated  Level of consciousness: awake and alert  Airway patency: patent  Nausea & Vomiting: no nausea and no vomiting  Complications: no  Cardiovascular status: blood pressure returned to baseline  Respiratory status: acceptable  Hydration status: euvolemic

## 2021-07-20 ENCOUNTER — OFFICE VISIT (OUTPATIENT)
Dept: ORTHOPEDIC SURGERY | Age: 81
End: 2021-07-20
Payer: MEDICARE

## 2021-07-20 VITALS — TEMPERATURE: 98.3 F

## 2021-07-20 DIAGNOSIS — S61.412D LACERATION OF HAND INVOLVING EXTENSOR TENDON, LEFT, SUBSEQUENT ENCOUNTER: Primary | ICD-10-CM

## 2021-07-20 DIAGNOSIS — S66.822D LACERATION OF HAND INVOLVING EXTENSOR TENDON, LEFT, SUBSEQUENT ENCOUNTER: Primary | ICD-10-CM

## 2021-07-20 PROCEDURE — 99024 POSTOP FOLLOW-UP VISIT: CPT | Performed by: PHYSICIAN ASSISTANT

## 2021-07-20 PROCEDURE — 29131 APPL FINGER SPLINT DYNAMIC: CPT | Performed by: PHYSICIAN ASSISTANT

## 2021-07-20 PROCEDURE — 99212 OFFICE O/P EST SF 10 MIN: CPT | Performed by: PHYSICIAN ASSISTANT

## 2021-07-20 NOTE — PATIENT INSTRUCTIONS
Remove sutures  Steri strips should fall off in the shower at some point, if they do not fall off in 10 days, remove them  Dressing: Can remove dressing in 1-2 days then open to air  Can shower tomorrow  NO Soaking or submerging incision in water until fully healed & all scabs are gone    WB:  Non-weight bearing through the first and second fingers    Splint index finger straight and elieser taped to her middle finger while using her hand    Therapy: start outpatient therapy  [facility] to [facility]  Phone number: [phone]    Continue with ice to the injured extremity 2-3 times per day for swelling  If able continue with elevation and compression    Follow up in 2 weeks

## 2021-07-23 NOTE — PROGRESS NOTES
Chief Complaint   Patient presents with    Follow-up     2wk post-op L hand extensor tendon repair. Denies pain, denies numbness or tingling, fever or chills. OP:SURGEON: Dr. Zelalem Proctor MD  DATE OF PROCEDURE: 7/8/2021  PROCEDURE:1.  Repair of extensor indices to second digit left hand   2.  Repair of extensor indices proprius left hand   3. Complex repair of laceration, 4 cm in length, left hand    POD: 2 weeks    Subjective:  Ardith Gowers is following up from the above surgery. He is limiting his WB on left hand extremity. 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 Pain well controlled. They denies numbness, tingling, weakness to the left hand extremity. Denies calf pain, chest pain, or shortness of breath. Patient is not participating in therapy, presents for initial post op appt. Denies any new concerns, has maintained splint since surgery    Review of Systems -  All pertinent positives/negatives per HPI       Objective:    General: Alert and oriented X 3, normocephalic atraumatic, external ears and eye normal, sclera clear, no acute distress, respirations easy and unlabored with no audible wheezes, skin warm and dry, speech and dress appropriate for noted age, affect euthymic. Extremity:  Right Upper Extremity  Skin is clean dry and intact   mild edema noted to the dorsum of the hand  2+ Radial pulse, fingers warm with BCR  Flex/extension intact to wrist, thumb and fingers   Index finger full flexion not assessed, intact extension at MCP, PIP and DIP  Finger opposition intact  Finger adduction/abduction intact  Finger crossover intact  unable to make concentric fist secondary to index finger  Subjectively states sensation intact to Median Nerve, Ulnar Nerve and Radial Nerve distribution  Incision(s) healing well, no significant drainage, no dehiscence, no significant erythema.  Sutures intact and ready for removal    Temp 98.3 °F (36.8 °C)     Assessment:   Diagnosis Orders   1. Laceration of hand involving extensor tendon, left, subsequent encounter  Amb External Referral To Occupational Therapy       Plan:  Remove sutures  Steri strips should fall off in the shower at some point, if they do not fall off in 10 days, remove them  Dressing: Can remove dressing in 1-2 days then open to air  Can shower tomorrow  NO Soaking or submerging incision in water until fully healed & all scabs are gone    WB:  Non-weight bearing through the first and second fingers    Splint index finger straight and elieser taped to her middle finger while using her hand    Therapy: start outpatient therapy  Prescription to [facility]  Phone number: [phone]    Continue with ice to the injured extremity 2-3 times per day for swelling  If able continue with elevation and compression    Follow up in 2 weeks - Patient was seen today with Dr. Ovidio Wilknison    Electronically signed by Lexy Mead PA-C on 7/23/2021 at 8:50 AM  Note: This report was completed using [employer] voiced recognition software. Every effort has been made to ensure accuracy; however, inadvertent computerized transcription errors may be present.

## 2021-08-02 ENCOUNTER — OFFICE VISIT (OUTPATIENT)
Dept: ORTHOPEDIC SURGERY | Age: 81
End: 2021-08-02
Payer: MEDICARE

## 2021-08-02 VITALS — TEMPERATURE: 97.6 F

## 2021-08-02 DIAGNOSIS — S66.822D LACERATION OF HAND INVOLVING EXTENSOR TENDON, LEFT, SUBSEQUENT ENCOUNTER: Primary | ICD-10-CM

## 2021-08-02 DIAGNOSIS — S61.412D LACERATION OF HAND INVOLVING EXTENSOR TENDON, LEFT, SUBSEQUENT ENCOUNTER: Primary | ICD-10-CM

## 2021-08-02 PROCEDURE — 99024 POSTOP FOLLOW-UP VISIT: CPT | Performed by: PHYSICIAN ASSISTANT

## 2021-08-02 PROCEDURE — 99212 OFFICE O/P EST SF 10 MIN: CPT | Performed by: PHYSICIAN ASSISTANT

## 2021-08-02 RX ORDER — COVID-19 ANTIGEN TEST
KIT MISCELLANEOUS
COMMUNITY

## 2021-08-02 NOTE — PROGRESS NOTES
Chief Complaint   Patient presents with    Injury      Tendon repair left hand        OP:SURGEON: Dr. Hal Jacques MD  DATE OF PROCEDURE: 7-8-21  PROCEDURE: 1. Repair of extensor indices to second digit left hand     2. Repair of extensor indices proprius left hand     3. Complex repair of laceration, 4 cm in length, left hand     POD: 3 weeks    Subjective:  Loren Guido is following up from the above surgery. He is NWB on left upper extremity second digit. He ambulates with no assistive device. Pain to extremity is mild and is not taking prescribed pain medication. They denies numbness or tingling to the left upper extremity. Denies calf pain, chest pain, or shortness of breath. Patient is  participating in therapy, outpatient therapy. Patient states that he just started hand therapy in [place]. He has been wearing the custom made left hand splint for comfort and protection. He states that he has been working with limited usage of the left hand. Review of Systems -  All pertinent positives/negatives per HPI       Objective:    General: Alert and oriented X 3, normocephalic atraumatic, external ears and eye normal, sclera clear, no acute distress, respirations easy and unlabored with no audible wheezes, skin warm and dry, speech and dress appropriate for noted age, affect euthymic.     Extremity:  Left Upper Extremity  Skin is clean dry and intact   moderate edema noted over the index finger  2+ Radial pulse, fingers warm with BCR  Flex/extension intact to wrist, thumb and fingers   Finger opposition intact  Finger adduction/abduction intact  Finger crossover intact  able to make concentric fist with some weakness of the second and third digits due to swelling and stiffness  Motion is intact in the index finger with flexion/extension at the MCP, PIP and DIP joints but limited due to stifffness and swelling  Subjectively states sensation is intact to light touch over the Median Nerve, Ulnar Nerve and Radial Nerve distribution  Incision well-healed. Temp 97.6 °F (36.4 °C) (Oral)     XR:   Not taken today. Assessment:   Diagnosis Orders   1. Laceration of hand involving extensor tendon, left, subsequent encounter       Plan:  Continue wearing custom left hand splint for comfort and protection. Continue limited usage of left index finger. Continue OT/hand therapy following extensor tendon repair protocol at the 3-week bhavya. Follow-up in 3 weeks for reevaluation. Call if any questions or concerns. Electronically signed by JOEL Love on 8/2/2021 at 9:47 AM  Note: This report was completed using [email or web address] voiced recognition software. Every effort has been made to ensure accuracy; however, inadvertent computerized transcription errors may be present.

## 2021-08-02 NOTE — PATIENT INSTRUCTIONS
Continue wearing custom left hand splint for comfort and protection. Continue limited usage of left index finger. Continue OT/hand therapy following extensor tendon repair protocol at the 3-week bhavya. Follow-up in 3 weeks for reevaluation. Call if any questions or concerns.

## 2021-08-23 ENCOUNTER — OFFICE VISIT (OUTPATIENT)
Dept: ORTHOPEDIC SURGERY | Age: 81
End: 2021-08-23
Payer: MEDICARE

## 2021-08-23 VITALS — TEMPERATURE: 97.1 F

## 2021-08-23 DIAGNOSIS — S66.822D LACERATION OF HAND INVOLVING EXTENSOR TENDON, LEFT, SUBSEQUENT ENCOUNTER: Primary | ICD-10-CM

## 2021-08-23 DIAGNOSIS — S61.412D LACERATION OF HAND INVOLVING EXTENSOR TENDON, LEFT, SUBSEQUENT ENCOUNTER: Primary | ICD-10-CM

## 2021-08-23 PROCEDURE — 99212 OFFICE O/P EST SF 10 MIN: CPT | Performed by: PHYSICIAN ASSISTANT

## 2021-08-23 PROCEDURE — 99024 POSTOP FOLLOW-UP VISIT: CPT | Performed by: PHYSICIAN ASSISTANT

## 2021-08-23 NOTE — PATIENT INSTRUCTIONS
Okay to come out of the left hand brace. Okay to increase usage of the left hand. Continue hand therapy for now and when finished continue with home exercise program.    Follow-up in 6 weeks for reevaluation. Call any questions or concerns.

## 2021-08-23 NOTE — PROGRESS NOTES
Robert Aranda is a 68 y.o. male who presents for follow up of left hand    SURGEON: Dr. Yamilet Marcelino MD  Date of Injury/Surgery: 7/8/2021  Date last seen in office: 8/2/2021    Symptoms: better  New complaints: none    Cast/Splint, Brace, or Dressings: Clean, dry and intact, Well fitting and Taken down for visit    Weightbearing: left upper Weight bearing as tolerated      Assistive device: custom hand splint  Participating in therapy (location if yes)?  yes, 1x per week    Refills Needed: None  Order/Referral Needed: N/A

## 2021-09-03 NOTE — PROGRESS NOTES
Karmen Cadet is a 68 y.o. male who presents for evaluation of left hand    Referred from [facility] ED    Symptom onset: Date: 6/30/2021  Mechanism of Injury: power saw    Previous Treatments: brace/splint     Weightbearing: left upper Non-weight bearing    Assistive device No Device  Participating in therapy?  no Statement Selected

## 2021-10-13 ENCOUNTER — OFFICE VISIT (OUTPATIENT)
Dept: ORTHOPEDIC SURGERY | Age: 81
End: 2021-10-13
Payer: MEDICARE

## 2021-10-13 VITALS — TEMPERATURE: 97.2 F

## 2021-10-13 DIAGNOSIS — S66.822D LACERATION OF HAND INVOLVING EXTENSOR TENDON, LEFT, SUBSEQUENT ENCOUNTER: Primary | ICD-10-CM

## 2021-10-13 DIAGNOSIS — S61.412D LACERATION OF HAND INVOLVING EXTENSOR TENDON, LEFT, SUBSEQUENT ENCOUNTER: Primary | ICD-10-CM

## 2021-10-13 PROCEDURE — 99212 OFFICE O/P EST SF 10 MIN: CPT | Performed by: PHYSICIAN ASSISTANT

## 2021-10-13 PROCEDURE — 99213 OFFICE O/P EST LOW 20 MIN: CPT | Performed by: PHYSICIAN ASSISTANT

## 2021-10-13 NOTE — PROGRESS NOTES
Leela Cruz is a 80 y.o. male who presents for follow up of LEFT HAND EXPLORATION AND EXTENSOR TENDON REPAIR    SURGEON: Dr. Figueroa Blancas MD  Date of Injury/Surgery: 07/08/2021  Date last seen in office: 08/23/2021    Symptoms: better  New complaints: patient states that his hand and finger are getting better but he does not have the same ROM as in his right hand; patient is right hand dominant    Weightbearing: left upper Full weight bearing      Assistive device No Device  Participating in therapy (location if yes)?  no    Refills Needed: None  Order/Referral Needed: no

## 2021-10-13 NOTE — PROGRESS NOTES
Chief Complaint   Patient presents with    Hand Pain     LEFT HAND EXPLORATION AND EXTENSOR TENDON REPAIR; dos: 07/08/2021; patient states that his hand and finger are getting better but he does not have the same ROM as in his right hand/ 2nd finger; 0/10 pain        OP:SURGEON: Dr. Luciana Marti MD  DATE OF PROCEDURE: 7-8-21  PROCEDURE: 1. Repair of extensor indices to second digit left hand     2. Repair of extensor indices proprius left hand     3. Complex repair of laceration, 4 cm in length, left hand     POD: 3+ months    Subjective:  Rajwinder Morales is following up from the above surgery. He is WBAT on left upper extremity. He ambulates with no assistive device. Pain to extremity is none and is not taking prescribed pain medication. They denies numbness, tingling to the left upper extremity. Denies calf pain, chest pain, or shortness of breath. Patient is not participating in therapy at this time. Overall he is doing well after surgery. States that he is basically doing all of his daily activities without difficulty. Review of Systems -  All pertinent positives/negatives per HPI       Objective:    General: Alert and oriented X 3, normocephalic atraumatic, external ears and eye normal, sclera clear, no acute distress, respirations easy and unlabored with no audible wheezes, skin warm and dry, speech and dress appropriate for noted age, affect euthymic.     Extremity:  Left Upper Extremity  Skin is clean dry and intact   no edema noted  2+ Radial pulse, fingers warm with BCR  Flex/extension intact to wrist, thumb and fingers   Finger opposition intact  Finger adduction/abduction intact  Finger crossover intact  able to make concentric fist  Mild limitation with extension of the MP and PIP joints of the index finger  Subjectively states sensation is intact to light touch over the Median Nerve, Ulnar Nerve and Radial Nerve distribution  Incision well healed    Temp 97.2 °F (36.2 °C)     XR:   Not taken today.    Assessment:   Diagnosis Orders   1. Laceration of hand involving extensor tendon, left, subsequent encounter       Plan:  Continue activities as tolerated on the left hand. Overall he is doing well and he is happy with the motion and strength he has regained in the left hand. States that he is able to do all of his activities without difficulty. At this point, he will be seen on an as-needed basis and he was advised to contact the office if he has any problems or concerns. Electronically signed by JOEL Cruz on 10/13/2021 at 9:56 AM  Note: This report was completed using [employer] voiced recognition software. Every effort has been made to ensure accuracy; however, inadvertent computerized transcription errors may be present.

## 2023-11-08 ENCOUNTER — HOSPITAL ENCOUNTER (INPATIENT)
Age: 83
LOS: 5 days | Discharge: HOME HEALTH | DRG: 472 | End: 2023-11-13
Attending: EMERGENCY MEDICINE | Admitting: SURGERY
Payer: MEDICARE

## 2023-11-08 ENCOUNTER — ANESTHESIA EVENT (OUTPATIENT)
Dept: OPERATING ROOM | Age: 83
End: 2023-11-08
Payer: MEDICARE

## 2023-11-08 ENCOUNTER — APPOINTMENT (OUTPATIENT)
Dept: CT IMAGING | Age: 83
DRG: 472 | End: 2023-11-08
Payer: MEDICARE

## 2023-11-08 DIAGNOSIS — S12.500A CLOSED DISPLACED FRACTURE OF SIXTH CERVICAL VERTEBRA, UNSPECIFIED FRACTURE MORPHOLOGY, INITIAL ENCOUNTER (HCC): ICD-10-CM

## 2023-11-08 DIAGNOSIS — S09.90XA INJURY OF HEAD, INITIAL ENCOUNTER: Primary | ICD-10-CM

## 2023-11-08 PROBLEM — D62 ACUTE BLOOD LOSS ANEMIA: Status: ACTIVE | Noted: 2023-11-08

## 2023-11-08 PROBLEM — R79.89 ELEVATED LFTS: Status: ACTIVE | Noted: 2023-11-08
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
## 2023-11-08 LAB
ABO + RH BLD: NORMAL
ALBUMIN SERPL-MCNC: 4.1 G/DL (ref 3.5–5.2)
ALP SERPL-CCNC: 63 U/L (ref 40–129)
ALT SERPL-CCNC: 36 U/L (ref 0–40)
ANION GAP SERPL CALCULATED.3IONS-SCNC: 12 MMOL/L (ref 7–16)
ARM BAND NUMBER: NORMAL
AST SERPL-CCNC: 61 U/L (ref 0–39)
BASOPHILS # BLD: 0.03 K/UL (ref 0–0.2)
BASOPHILS NFR BLD: 0 % (ref 0–2)
BILIRUB SERPL-MCNC: 0.8 MG/DL (ref 0–1.2)
BILIRUB UR QL STRIP: NEGATIVE
BLOOD BANK SAMPLE EXPIRATION: NORMAL
BLOOD GROUP ANTIBODIES SERPL: NEGATIVE
BUN SERPL-MCNC: 22 MG/DL (ref 6–23)
CALCIUM SERPL-MCNC: 9 MG/DL (ref 8.6–10.2)
CHLORIDE SERPL-SCNC: 109 MMOL/L (ref 98–107)
CLARITY UR: CLEAR
CO2 SERPL-SCNC: 19 MMOL/L (ref 22–29)
COLOR UR: YELLOW
CREAT SERPL-MCNC: 0.7 MG/DL (ref 0.7–1.2)
EKG ATRIAL RATE: 71 BPM
EKG P AXIS: 51 DEGREES
EKG P-R INTERVAL: 182 MS
EKG Q-T INTERVAL: 426 MS
EKG QRS DURATION: 104 MS
EKG QTC CALCULATION (BAZETT): 462 MS
EKG R AXIS: 30 DEGREES
EKG T AXIS: 22 DEGREES
EKG VENTRICULAR RATE: 71 BPM
EOSINOPHIL # BLD: 0.01 K/UL (ref 0.05–0.5)
EOSINOPHILS RELATIVE PERCENT: 0 % (ref 0–6)
ERYTHROCYTE [DISTWIDTH] IN BLOOD BY AUTOMATED COUNT: 13.2 % (ref 11.5–15)
GFR SERPL CREATININE-BSD FRML MDRD: >60 ML/MIN/1.73M2
GLUCOSE SERPL-MCNC: 123 MG/DL (ref 74–99)
GLUCOSE UR STRIP-MCNC: NEGATIVE MG/DL
HCT VFR BLD AUTO: 36.5 % (ref 37–54)
HGB BLD-MCNC: 12.1 G/DL (ref 12.5–16.5)
HGB UR QL STRIP.AUTO: NEGATIVE
IMM GRANULOCYTES # BLD AUTO: 0.07 K/UL (ref 0–0.58)
IMM GRANULOCYTES NFR BLD: 1 % (ref 0–5)
INR PPP: 1.1
KETONES UR STRIP-MCNC: 40 MG/DL
LEUKOCYTE ESTERASE UR QL STRIP: NEGATIVE
LYMPHOCYTES NFR BLD: 0.4 K/UL (ref 1.5–4)
LYMPHOCYTES RELATIVE PERCENT: 4 % (ref 20–42)
MCH RBC QN AUTO: 30.3 PG (ref 26–35)
MCHC RBC AUTO-ENTMCNC: 33.2 G/DL (ref 32–34.5)
MCV RBC AUTO: 91.5 FL (ref 80–99.9)
MONOCYTES NFR BLD: 0.8 K/UL (ref 0.1–0.95)
MONOCYTES NFR BLD: 8 % (ref 2–12)
NEUTROPHILS NFR BLD: 87 % (ref 43–80)
NEUTS SEG NFR BLD: 8.79 K/UL (ref 1.8–7.3)
NITRITE UR QL STRIP: NEGATIVE
PH UR STRIP: 5.5 [PH] (ref 5–9)
PLATELET # BLD AUTO: 198 K/UL (ref 130–450)
PMV BLD AUTO: 9.9 FL (ref 7–12)
POTASSIUM SERPL-SCNC: 3.7 MMOL/L (ref 3.5–5)
PROT SERPL-MCNC: 6.2 G/DL (ref 6.4–8.3)
PROT UR STRIP-MCNC: NEGATIVE MG/DL
PROTHROMBIN TIME: 12.1 SEC (ref 9.3–12.4)
RBC # BLD AUTO: 3.99 M/UL (ref 3.8–5.8)
RBC # BLD: NORMAL 10*6/UL
RBC #/AREA URNS HPF: ABNORMAL /HPF
SODIUM SERPL-SCNC: 140 MMOL/L (ref 132–146)
SP GR UR STRIP: 1.02 (ref 1–1.03)
UROBILINOGEN UR STRIP-ACNC: 0.2 EU/DL (ref 0–1)
WBC #/AREA URNS HPF: ABNORMAL /HPF
WBC OTHER # BLD: 10.1 K/UL (ref 4.5–11.5)

## 2023-11-08 PROCEDURE — 85025 COMPLETE CBC W/AUTO DIFF WBC: CPT

## 2023-11-08 PROCEDURE — 96374 THER/PROPH/DIAG INJ IV PUSH: CPT

## 2023-11-08 PROCEDURE — 36415 COLL VENOUS BLD VENIPUNCTURE: CPT

## 2023-11-08 PROCEDURE — 72125 CT NECK SPINE W/O DYE: CPT

## 2023-11-08 PROCEDURE — 6360000004 HC RX CONTRAST MEDICATION: Performed by: GENERAL PRACTICE

## 2023-11-08 PROCEDURE — 70450 CT HEAD/BRAIN W/O DYE: CPT

## 2023-11-08 PROCEDURE — 70498 CT ANGIOGRAPHY NECK: CPT

## 2023-11-08 PROCEDURE — 2580000003 HC RX 258

## 2023-11-08 PROCEDURE — L0172 CERV COL SR FOAM 2PC PRE OTS: HCPCS

## 2023-11-08 PROCEDURE — 86850 RBC ANTIBODY SCREEN: CPT

## 2023-11-08 PROCEDURE — 2580000003 HC RX 258: Performed by: STUDENT IN AN ORGANIZED HEALTH CARE EDUCATION/TRAINING PROGRAM

## 2023-11-08 PROCEDURE — 85610 PROTHROMBIN TIME: CPT

## 2023-11-08 PROCEDURE — 86900 BLOOD TYPING SEROLOGIC ABO: CPT

## 2023-11-08 PROCEDURE — 2W32XYZ IMMOBILIZATION OF NECK USING OTHER DEVICE: ICD-10-PCS | Performed by: EMERGENCY MEDICINE

## 2023-11-08 PROCEDURE — 6360000002 HC RX W HCPCS: Performed by: EMERGENCY MEDICINE

## 2023-11-08 PROCEDURE — 99222 1ST HOSP IP/OBS MODERATE 55: CPT | Performed by: NEUROLOGICAL SURGERY

## 2023-11-08 PROCEDURE — 81001 URINALYSIS AUTO W/SCOPE: CPT

## 2023-11-08 PROCEDURE — 87086 URINE CULTURE/COLONY COUNT: CPT

## 2023-11-08 PROCEDURE — 71260 CT THORAX DX C+: CPT

## 2023-11-08 PROCEDURE — 99285 EMERGENCY DEPT VISIT HI MDM: CPT

## 2023-11-08 PROCEDURE — 86901 BLOOD TYPING SEROLOGIC RH(D): CPT

## 2023-11-08 PROCEDURE — 1200000000 HC SEMI PRIVATE

## 2023-11-08 PROCEDURE — 6360000002 HC RX W HCPCS: Performed by: STUDENT IN AN ORGANIZED HEALTH CARE EDUCATION/TRAINING PROGRAM

## 2023-11-08 PROCEDURE — 99223 1ST HOSP IP/OBS HIGH 75: CPT | Performed by: SURGERY

## 2023-11-08 PROCEDURE — 93010 ELECTROCARDIOGRAM REPORT: CPT | Performed by: INTERNAL MEDICINE

## 2023-11-08 PROCEDURE — 6370000000 HC RX 637 (ALT 250 FOR IP): Performed by: STUDENT IN AN ORGANIZED HEALTH CARE EDUCATION/TRAINING PROGRAM

## 2023-11-08 PROCEDURE — 93005 ELECTROCARDIOGRAM TRACING: CPT | Performed by: STUDENT IN AN ORGANIZED HEALTH CARE EDUCATION/TRAINING PROGRAM

## 2023-11-08 PROCEDURE — 80053 COMPREHEN METABOLIC PANEL: CPT

## 2023-11-08 RX ORDER — POLYETHYLENE GLYCOL 3350 17 G/17G
17 POWDER, FOR SOLUTION ORAL DAILY
Status: DISCONTINUED | OUTPATIENT
Start: 2023-11-08 | End: 2023-11-13 | Stop reason: HOSPADM

## 2023-11-08 RX ORDER — OXYCODONE HYDROCHLORIDE 5 MG/1
5 TABLET ORAL EVERY 4 HOURS PRN
Status: DISCONTINUED | OUTPATIENT
Start: 2023-11-08 | End: 2023-11-13 | Stop reason: HOSPADM

## 2023-11-08 RX ORDER — SODIUM CHLORIDE 0.9 % (FLUSH) 0.9 %
10 SYRINGE (ML) INJECTION EVERY 12 HOURS SCHEDULED
Status: DISCONTINUED | OUTPATIENT
Start: 2023-11-08 | End: 2023-11-13 | Stop reason: HOSPADM

## 2023-11-08 RX ORDER — SODIUM CHLORIDE 9 MG/ML
INJECTION, SOLUTION INTRAVENOUS PRN
Status: DISCONTINUED | OUTPATIENT
Start: 2023-11-08 | End: 2023-11-13 | Stop reason: HOSPADM

## 2023-11-08 RX ORDER — SODIUM CHLORIDE 0.9 % (FLUSH) 0.9 %
5-40 SYRINGE (ML) INJECTION PRN
Status: DISCONTINUED | OUTPATIENT
Start: 2023-11-08 | End: 2023-11-09 | Stop reason: HOSPADM

## 2023-11-08 RX ORDER — SODIUM CHLORIDE 9 MG/ML
INJECTION, SOLUTION INTRAVENOUS CONTINUOUS
Status: DISCONTINUED | OUTPATIENT
Start: 2023-11-08 | End: 2023-11-10

## 2023-11-08 RX ORDER — MORPHINE SULFATE 4 MG/ML
4 INJECTION, SOLUTION INTRAMUSCULAR; INTRAVENOUS ONCE
Status: COMPLETED | OUTPATIENT
Start: 2023-11-08 | End: 2023-11-08

## 2023-11-08 RX ORDER — METHOCARBAMOL 500 MG/1
1000 TABLET, FILM COATED ORAL 4 TIMES DAILY
Status: DISCONTINUED | OUTPATIENT
Start: 2023-11-08 | End: 2023-11-13 | Stop reason: HOSPADM

## 2023-11-08 RX ORDER — SODIUM CHLORIDE 0.9 % (FLUSH) 0.9 %
10 SYRINGE (ML) INJECTION PRN
Status: DISCONTINUED | OUTPATIENT
Start: 2023-11-08 | End: 2023-11-13 | Stop reason: HOSPADM

## 2023-11-08 RX ORDER — SODIUM CHLORIDE, SODIUM LACTATE, POTASSIUM CHLORIDE, CALCIUM CHLORIDE 600; 310; 30; 20 MG/100ML; MG/100ML; MG/100ML; MG/100ML
INJECTION, SOLUTION INTRAVENOUS CONTINUOUS
Status: DISCONTINUED | OUTPATIENT
Start: 2023-11-08 | End: 2023-11-08

## 2023-11-08 RX ORDER — SODIUM CHLORIDE 9 MG/ML
INJECTION, SOLUTION INTRAVENOUS CONTINUOUS
Status: DISCONTINUED | OUTPATIENT
Start: 2023-11-08 | End: 2023-11-08

## 2023-11-08 RX ORDER — ONDANSETRON 4 MG/1
4 TABLET, ORALLY DISINTEGRATING ORAL EVERY 8 HOURS PRN
Status: DISCONTINUED | OUTPATIENT
Start: 2023-11-08 | End: 2023-11-13 | Stop reason: HOSPADM

## 2023-11-08 RX ORDER — ONDANSETRON 2 MG/ML
4 INJECTION INTRAMUSCULAR; INTRAVENOUS EVERY 6 HOURS PRN
Status: DISCONTINUED | OUTPATIENT
Start: 2023-11-08 | End: 2023-11-13 | Stop reason: HOSPADM

## 2023-11-08 RX ORDER — ACETAMINOPHEN 325 MG/1
650 TABLET ORAL EVERY 6 HOURS
Status: DISCONTINUED | OUTPATIENT
Start: 2023-11-08 | End: 2023-11-13 | Stop reason: HOSPADM

## 2023-11-08 RX ORDER — SODIUM CHLORIDE 9 MG/ML
INJECTION, SOLUTION INTRAVENOUS PRN
Status: DISCONTINUED | OUTPATIENT
Start: 2023-11-08 | End: 2023-11-09 | Stop reason: HOSPADM

## 2023-11-08 RX ORDER — OXYCODONE HYDROCHLORIDE 10 MG/1
10 TABLET ORAL EVERY 4 HOURS PRN
Status: DISCONTINUED | OUTPATIENT
Start: 2023-11-08 | End: 2023-11-13 | Stop reason: HOSPADM

## 2023-11-08 RX ORDER — CALCIUM CARBONATE 500(1250)
500 TABLET ORAL DAILY
COMMUNITY

## 2023-11-08 RX ORDER — ACETAMINOPHEN 500 MG
1000 TABLET ORAL EVERY 12 HOURS PRN
COMMUNITY

## 2023-11-08 RX ORDER — SODIUM CHLORIDE 0.9 % (FLUSH) 0.9 %
5-40 SYRINGE (ML) INJECTION EVERY 12 HOURS SCHEDULED
Status: DISCONTINUED | OUTPATIENT
Start: 2023-11-08 | End: 2023-11-09 | Stop reason: HOSPADM

## 2023-11-08 RX ADMIN — IOPAMIDOL 75 ML: 755 INJECTION, SOLUTION INTRAVENOUS at 09:44

## 2023-11-08 RX ADMIN — ACETAMINOPHEN 650 MG: 325 TABLET ORAL at 20:30

## 2023-11-08 RX ADMIN — HYDROMORPHONE HYDROCHLORIDE 0.5 MG: 1 INJECTION, SOLUTION INTRAMUSCULAR; INTRAVENOUS; SUBCUTANEOUS at 12:58

## 2023-11-08 RX ADMIN — SODIUM CHLORIDE, POTASSIUM CHLORIDE, SODIUM LACTATE AND CALCIUM CHLORIDE: 600; 310; 30; 20 INJECTION, SOLUTION INTRAVENOUS at 13:01

## 2023-11-08 RX ADMIN — SODIUM CHLORIDE: 9 INJECTION, SOLUTION INTRAVENOUS at 20:41

## 2023-11-08 RX ADMIN — ACETAMINOPHEN 650 MG: 325 TABLET ORAL at 16:45

## 2023-11-08 RX ADMIN — METHOCARBAMOL 1000 MG: 500 TABLET ORAL at 20:31

## 2023-11-08 RX ADMIN — MORPHINE SULFATE 4 MG: 4 INJECTION, SOLUTION INTRAMUSCULAR; INTRAVENOUS at 07:54

## 2023-11-08 RX ADMIN — OXYCODONE 5 MG: 5 TABLET ORAL at 22:42

## 2023-11-08 RX ADMIN — METHOCARBAMOL 1000 MG: 500 TABLET ORAL at 16:46

## 2023-11-08 ASSESSMENT — LIFESTYLE VARIABLES: HOW OFTEN DO YOU HAVE A DRINK CONTAINING ALCOHOL: NEVER

## 2023-11-08 ASSESSMENT — ENCOUNTER SYMPTOMS
ABDOMINAL PAIN: 0
NAUSEA: 0
SHORTNESS OF BREATH: 0
VOMITING: 0
TROUBLE SWALLOWING: 0
PHOTOPHOBIA: 0

## 2023-11-08 ASSESSMENT — PAIN DESCRIPTION - LOCATION
LOCATION: NECK
LOCATION: BACK;NECK
LOCATION: NECK
LOCATION: BACK;NECK

## 2023-11-08 ASSESSMENT — PAIN DESCRIPTION - DESCRIPTORS
DESCRIPTORS: NAGGING
DESCRIPTORS: DISCOMFORT;ACHING
DESCRIPTORS: ACHING;DISCOMFORT;THROBBING
DESCRIPTORS: ACHING;THROBBING

## 2023-11-08 ASSESSMENT — PATIENT HEALTH QUESTIONNAIRE - PHQ9
SUM OF ALL RESPONSES TO PHQ QUESTIONS 1-9: 0
SUM OF ALL RESPONSES TO PHQ QUESTIONS 1-9: 0
1. LITTLE INTEREST OR PLEASURE IN DOING THINGS: 0
SUM OF ALL RESPONSES TO PHQ9 QUESTIONS 1 & 2: 0
SUM OF ALL RESPONSES TO PHQ QUESTIONS 1-9: 0
2. FEELING DOWN, DEPRESSED OR HOPELESS: 0
SUM OF ALL RESPONSES TO PHQ QUESTIONS 1-9: 0

## 2023-11-08 ASSESSMENT — PAIN - FUNCTIONAL ASSESSMENT
PAIN_FUNCTIONAL_ASSESSMENT: PREVENTS OR INTERFERES SOME ACTIVE ACTIVITIES AND ADLS
PAIN_FUNCTIONAL_ASSESSMENT: 0-10

## 2023-11-08 ASSESSMENT — PAIN SCALES - GENERAL
PAINLEVEL_OUTOF10: 8
PAINLEVEL_OUTOF10: 5
PAINLEVEL_OUTOF10: 7
PAINLEVEL_OUTOF10: 8

## 2023-11-08 ASSESSMENT — PAIN DESCRIPTION - ORIENTATION: ORIENTATION: MID

## 2023-11-08 NOTE — DISCHARGE INSTRUCTIONS
Discharge Instructions:     1. No lifting more than 10 pounds   2. Walking is encouraged, slowly increase time distance   3. Refrain from excessive bending and twisting of the neck   4. Martinez Mech will be removed 2 weeks after surgery  5. Patient may shower. DO NOT soak or scrub at the incision site. 6. Hard cervical (neck) collar is to be worn for the next 3 months. Okay to take off to shave and shower  7. Take medications as prescribed. Continue taking stool softener while taking narcotic pain medications. 8. No sexual activity for one (1) month after surgery   9. Follow-up in the office in 4 weeks in the Neurosurgery clinic with repeat upright AP and lateral cervical x-rays. Call with any questions/concerns, pain control issues, or medication refills. TRAUMA SERVICES DISCHARGE INSTRUCTIONS    Call [phone], option 2, for any questions/concerns and for follow-up appointment in 2weeks . Please follow the instructions checked below:    During the course of your workup, we identified an incidental finding of:  thyroid nodule. Please follow-up with your primary care provider. ACTIVITY INSTRUCTIONS  Increase activity as tolerated  No heavy lifting or strenuous activity  Take your incentive spirometer home and use 4-6 times/day    WOUND/DRESSING INSTRUCTIONS:  You may shower. No sitting in bath tub, hot tub or swimming until cleared by physician. Ice to areas of pain for first 24 hours. Heat to areas of pain after that. Wash areas of lacerations/abrasions with soap & water. Rinse well. Pat dry with clean towel. Apply thin layer of Bacitracin, Neosporin, or triple antibiotic cream to affected area 2-3 times per day. Keep wounds clean and dry. MEDICATION INSTRUCTIONS  Take medication as prescribed. When taking pain medications, you may experience dizziness or drowsiness. Do not drink alcohol or drive when taking these medications.   You may experience constipation while taking pain

## 2023-11-08 NOTE — CONSULTS
NEUROSURGERY INPATIENT CONSULT NOTE    Chief Complaint: Neck pain after a fall     HPI:   Alessia Bowers is a 82 y.o.  male who has a PMH of prostate cancer who presents today after a fall. He states he was in his shower when he went to go reach for his towel and fell hitting his face and neck. On exam patient admits to some neck pain. He denies any pain down his arms. No numbness or weakness. Denies loss of bowel or bladder, saddle anesthesia, headache, loss of dexterity, abnormal gait, fever, chills, N/V, or SOB. CT scan with C6 and C7 fracture with grade 2 subluxation of C6 on C7 which is why Neurosurgery was consulted. Past Medical History:   Diagnosis Date    Prostate cancer ([address] [facility]) approx 2017     Past Surgical History:   Procedure Laterality Date    CARPAL TUNNEL RELEASE Left 7/8/2021    LEFT HAND EXPLORATION AND EXTENSOR TENDON REPAIR performed by Beny Cedeno MD at [address] [facility] Right     KNEE ARTHROPLASTY Left     ROTATOR CUFF REPAIR Right       No family history on file.      Medications:   Current Facility-Administered Medications   Medication Dose Route Frequency Provider Last Rate Last Admin    sodium chloride flush 0.9 % injection 10 mL  10 mL IntraVENous 2 times per day Mack Gan MD        sodium chloride flush 0.9 % injection 10 mL  10 mL IntraVENous PRN Mack Gan MD        0.9 % sodium chloride infusion   IntraVENous PRN Mack Gan MD        methocarbamol (ROBAXIN) tablet 1,000 mg  1,000 mg Oral 4x Daily Mack Gan MD        ondansetron (ZOFRAN-ODT) disintegrating tablet 4 mg  4 mg Oral Q8H PRN Mack Gan MD        Or    ondansetron [facility]) injection 4 mg  4 mg IntraVENous Q6H PRN Mack Gan MD        polyethylene glycol (GLYCOLAX) packet 17 g  17 g Oral Daily Mack Gan MD        lactated ringers IV soln infusion   IntraVENous Continuous Mack Gan MD        acetaminophen (TYLENOL) tablet 650 mg  650 mg Oral Q6H

## 2023-11-09 ENCOUNTER — APPOINTMENT (OUTPATIENT)
Dept: GENERAL RADIOLOGY | Age: 83
DRG: 472 | End: 2023-11-09
Payer: MEDICARE

## 2023-11-09 ENCOUNTER — ANESTHESIA (OUTPATIENT)
Dept: OPERATING ROOM | Age: 83
End: 2023-11-09
Payer: MEDICARE

## 2023-11-09 PROBLEM — E04.1 THYROID NODULE: Status: ACTIVE | Noted: 2023-11-09

## 2023-11-09 LAB
ANION GAP SERPL CALCULATED.3IONS-SCNC: 11 MMOL/L (ref 7–16)
BASOPHILS # BLD: 0 K/UL (ref 0–0.2)
BASOPHILS NFR BLD: 0 % (ref 0–2)
BUN BLD-MCNC: 8 MG/DL (ref 6–23)
BUN SERPL-MCNC: 13 MG/DL (ref 6–23)
CA-I BLD-SCNC: 1.05 MMOL/L (ref 1.15–1.33)
CALCIUM SERPL-MCNC: 8.2 MG/DL (ref 8.6–10.2)
CHLORIDE BLD-SCNC: 111 MMOL/L (ref 100–108)
CHLORIDE SERPL-SCNC: 107 MMOL/L (ref 98–107)
CO2 BLD CALC-SCNC: 21 MMOL/L (ref 22–29)
CO2 SERPL-SCNC: 20 MMOL/L (ref 22–29)
CREAT BLD-MCNC: 0.5 MG/DL (ref 0.7–1.2)
CREAT SERPL-MCNC: 0.6 MG/DL (ref 0.7–1.2)
EGFR, POC: >60 ML/MIN/1.73M2
EOSINOPHIL # BLD: 0 K/UL (ref 0.05–0.5)
EOSINOPHILS RELATIVE PERCENT: 0 % (ref 0–6)
ERYTHROCYTE [DISTWIDTH] IN BLOOD BY AUTOMATED COUNT: 13.3 % (ref 11.5–15)
GFR SERPL CREATININE-BSD FRML MDRD: >60 ML/MIN/1.73M2
GLUCOSE BLD-MCNC: 156 MG/DL (ref 74–99)
GLUCOSE SERPL-MCNC: 110 MG/DL (ref 74–99)
HCT VFR BLD AUTO: 34 % (ref 37–54)
HCT VFR BLD AUTO: 37.5 % (ref 37–54)
HGB BLD-MCNC: 12.5 G/DL (ref 12.5–16.5)
LYMPHOCYTES NFR BLD: 0.45 K/UL (ref 1.5–4)
LYMPHOCYTES RELATIVE PERCENT: 5 % (ref 20–42)
MCH RBC QN AUTO: 30.6 PG (ref 26–35)
MCHC RBC AUTO-ENTMCNC: 33.3 G/DL (ref 32–34.5)
MCV RBC AUTO: 91.9 FL (ref 80–99.9)
MONOCYTES NFR BLD: 0.3 K/UL (ref 0.1–0.95)
MONOCYTES NFR BLD: 4 % (ref 2–12)
NEGATIVE BASE EXCESS, ART: 6.6 MMOL/L
NEUTROPHILS NFR BLD: 91 % (ref 43–80)
NEUTS SEG NFR BLD: 7.94 K/UL (ref 1.8–7.3)
PLATELET # BLD AUTO: 197 K/UL (ref 130–450)
PMV BLD AUTO: 9.9 FL (ref 7–12)
POC ANION GAP: 12 MMOL/L (ref 7–16)
POC HCO3: 20.4 MMOL/L (ref 22–26)
POC HEMOGLOBIN (CALC): 11.5 G/DL (ref 12.5–15.5)
POC LACTIC ACID: 0.8 MMOL/L (ref 0.5–2.2)
POC O2 SATURATION: 96.8 % (ref 92–98.5)
POC PCO2: 45.3 MM HG (ref 35–45)
POC PH: 7.26 (ref 7.35–7.45)
POC PO2: 101.5 MM HG (ref 60–80)
POTASSIUM BLD-SCNC: 3.2 MMOL/L (ref 3.5–5)
POTASSIUM SERPL-SCNC: 3.6 MMOL/L (ref 3.5–5)
RBC # BLD AUTO: 4.08 M/UL (ref 3.8–5.8)
RBC # BLD: ABNORMAL 10*6/UL
SODIUM BLD-SCNC: 144 MMOL/L (ref 132–146)
SODIUM SERPL-SCNC: 138 MMOL/L (ref 132–146)
WBC OTHER # BLD: 8.7 K/UL (ref 4.5–11.5)

## 2023-11-09 PROCEDURE — 63015 REMOVE SPINE LAMINA >2 CRVCL: CPT | Performed by: NEUROLOGICAL SURGERY

## 2023-11-09 PROCEDURE — 2580000003 HC RX 258: Performed by: NEUROLOGICAL SURGERY

## 2023-11-09 PROCEDURE — 82803 BLOOD GASES ANY COMBINATION: CPT

## 2023-11-09 PROCEDURE — 2500000003 HC RX 250 WO HCPCS

## 2023-11-09 PROCEDURE — 80048 BASIC METABOLIC PNL TOTAL CA: CPT

## 2023-11-09 PROCEDURE — 3600000014 HC SURGERY LEVEL 4 ADDTL 15MIN: Performed by: NEUROLOGICAL SURGERY

## 2023-11-09 PROCEDURE — 6360000002 HC RX W HCPCS: Performed by: NEUROLOGICAL SURGERY

## 2023-11-09 PROCEDURE — 22614 ARTHRD PST TQ 1NTRSPC EA ADD: CPT | Performed by: NEUROLOGICAL SURGERY

## 2023-11-09 PROCEDURE — 22842 INSERT SPINE FIXATION DEVICE: CPT | Performed by: NEUROLOGICAL SURGERY

## 2023-11-09 PROCEDURE — C1729 CATH, DRAINAGE: HCPCS | Performed by: NEUROLOGICAL SURGERY

## 2023-11-09 PROCEDURE — 2580000003 HC RX 258

## 2023-11-09 PROCEDURE — 1200000000 HC SEMI PRIVATE

## 2023-11-09 PROCEDURE — L0174 CERV SR 2PC THOR EXT PRE OTS: HCPCS | Performed by: NEUROLOGICAL SURGERY

## 2023-11-09 PROCEDURE — 83605 ASSAY OF LACTIC ACID: CPT

## 2023-11-09 PROCEDURE — 6370000000 HC RX 637 (ALT 250 FOR IP): Performed by: STUDENT IN AN ORGANIZED HEALTH CARE EDUCATION/TRAINING PROGRAM

## 2023-11-09 PROCEDURE — 3700000000 HC ANESTHESIA ATTENDED CARE: Performed by: NEUROLOGICAL SURGERY

## 2023-11-09 PROCEDURE — 85025 COMPLETE CBC W/AUTO DIFF WBC: CPT

## 2023-11-09 PROCEDURE — 7100000001 HC PACU RECOVERY - ADDTL 15 MIN: Performed by: NEUROLOGICAL SURGERY

## 2023-11-09 PROCEDURE — 22315 CLOSED TX VERT FX W/MANJ: CPT | Performed by: NEUROLOGICAL SURGERY

## 2023-11-09 PROCEDURE — 85014 HEMATOCRIT: CPT

## 2023-11-09 PROCEDURE — 6360000002 HC RX W HCPCS: Performed by: STUDENT IN AN ORGANIZED HEALTH CARE EDUCATION/TRAINING PROGRAM

## 2023-11-09 PROCEDURE — 3700000001 HC ADD 15 MINUTES (ANESTHESIA): Performed by: NEUROLOGICAL SURGERY

## 2023-11-09 PROCEDURE — 6360000002 HC RX W HCPCS

## 2023-11-09 PROCEDURE — 80047 BASIC METABLC PNL IONIZED CA: CPT

## 2023-11-09 PROCEDURE — 7100000000 HC PACU RECOVERY - FIRST 15 MIN: Performed by: NEUROLOGICAL SURGERY

## 2023-11-09 PROCEDURE — 36415 COLL VENOUS BLD VENIPUNCTURE: CPT

## 2023-11-09 PROCEDURE — C9359 IMPLNT,BON VOID FILLER-PUTTY: HCPCS | Performed by: NEUROLOGICAL SURGERY

## 2023-11-09 PROCEDURE — 61783 SCAN PROC SPINAL: CPT | Performed by: NEUROLOGICAL SURGERY

## 2023-11-09 PROCEDURE — 2500000003 HC RX 250 WO HCPCS: Performed by: NEUROLOGICAL SURGERY

## 2023-11-09 PROCEDURE — 3600000004 HC SURGERY LEVEL 4 BASE: Performed by: NEUROLOGICAL SURGERY

## 2023-11-09 PROCEDURE — A4217 STERILE WATER/SALINE, 500 ML: HCPCS | Performed by: NEUROLOGICAL SURGERY

## 2023-11-09 PROCEDURE — 2720000010 HC SURG SUPPLY STERILE: Performed by: NEUROLOGICAL SURGERY

## 2023-11-09 PROCEDURE — 6370000000 HC RX 637 (ALT 250 FOR IP): Performed by: NEUROLOGICAL SURGERY

## 2023-11-09 PROCEDURE — 2709999900 HC NON-CHARGEABLE SUPPLY: Performed by: NEUROLOGICAL SURGERY

## 2023-11-09 PROCEDURE — 2580000003 HC RX 258: Performed by: STUDENT IN AN ORGANIZED HEALTH CARE EDUCATION/TRAINING PROGRAM

## 2023-11-09 PROCEDURE — 22600 ARTHRD PST TQ 1NTRSPC CRV: CPT | Performed by: NEUROLOGICAL SURGERY

## 2023-11-09 PROCEDURE — C1713 ANCHOR/SCREW BN/BN,TIS/BN: HCPCS | Performed by: NEUROLOGICAL SURGERY

## 2023-11-09 DEVICE — QUARTEX 3.5MM POLYAXIAL SCREW, 14MM
Type: IMPLANTABLE DEVICE | Site: BACK | Status: FUNCTIONAL
Brand: QUARTEX

## 2023-11-09 DEVICE — QUARTEX THREADED LOCKING CAP
Type: IMPLANTABLE DEVICE | Site: BACK | Status: FUNCTIONAL
Brand: QUARTEX

## 2023-11-09 DEVICE — QUARTEX 5.5MM POLYAXIAL SCREW, 28MM
Type: IMPLANTABLE DEVICE | Site: BACK | Status: FUNCTIONAL
Brand: QUARTEX

## 2023-11-09 DEVICE — 3.5MM CAPITOL CURVED ROD, 80MM
Type: IMPLANTABLE DEVICE | Site: BACK | Status: FUNCTIONAL
Brand: CAPITOL

## 2023-11-09 DEVICE — XEMPLIFI DBM PLUS, 10CC
Type: IMPLANTABLE DEVICE | Site: NECK | Status: FUNCTIONAL
Brand: XEMPLIFI

## 2023-11-09 RX ORDER — SODIUM CHLORIDE 0.9 % (FLUSH) 0.9 %
5-40 SYRINGE (ML) INJECTION PRN
Status: DISCONTINUED | OUTPATIENT
Start: 2023-11-09 | End: 2023-11-09 | Stop reason: HOSPADM

## 2023-11-09 RX ORDER — PROPOFOL 10 MG/ML
INJECTION, EMULSION INTRAVENOUS PRN
Status: DISCONTINUED | OUTPATIENT
Start: 2023-11-09 | End: 2023-11-09 | Stop reason: SDUPTHER

## 2023-11-09 RX ORDER — HYDROMORPHONE HYDROCHLORIDE 1 MG/ML
0.25 INJECTION, SOLUTION INTRAMUSCULAR; INTRAVENOUS; SUBCUTANEOUS EVERY 5 MIN PRN
Status: DISCONTINUED | OUTPATIENT
Start: 2023-11-09 | End: 2023-11-09 | Stop reason: HOSPADM

## 2023-11-09 RX ORDER — SODIUM CHLORIDE 9 MG/ML
INJECTION, SOLUTION INTRAVENOUS PRN
Status: DISCONTINUED | OUTPATIENT
Start: 2023-11-09 | End: 2023-11-09 | Stop reason: HOSPADM

## 2023-11-09 RX ORDER — HYDROMORPHONE HYDROCHLORIDE 2 MG/ML
INJECTION, SOLUTION INTRAMUSCULAR; INTRAVENOUS; SUBCUTANEOUS PRN
Status: DISCONTINUED | OUTPATIENT
Start: 2023-11-09 | End: 2023-11-09 | Stop reason: SDUPTHER

## 2023-11-09 RX ORDER — VANCOMYCIN HYDROCHLORIDE 500 MG/10ML
INJECTION, POWDER, LYOPHILIZED, FOR SOLUTION INTRAVENOUS PRN
Status: DISCONTINUED | OUTPATIENT
Start: 2023-11-09 | End: 2023-11-09 | Stop reason: ALTCHOICE

## 2023-11-09 RX ORDER — ROCURONIUM BROMIDE 10 MG/ML
INJECTION, SOLUTION INTRAVENOUS PRN
Status: DISCONTINUED | OUTPATIENT
Start: 2023-11-09 | End: 2023-11-09 | Stop reason: SDUPTHER

## 2023-11-09 RX ORDER — SODIUM CHLORIDE 9 MG/ML
INJECTION, SOLUTION INTRAVENOUS CONTINUOUS PRN
Status: DISCONTINUED | OUTPATIENT
Start: 2023-11-09 | End: 2023-11-09 | Stop reason: SDUPTHER

## 2023-11-09 RX ORDER — LABETALOL HYDROCHLORIDE 5 MG/ML
INJECTION, SOLUTION INTRAVENOUS PRN
Status: DISCONTINUED | OUTPATIENT
Start: 2023-11-09 | End: 2023-11-09 | Stop reason: SDUPTHER

## 2023-11-09 RX ORDER — SODIUM CHLORIDE, SODIUM LACTATE, POTASSIUM CHLORIDE, CALCIUM CHLORIDE 600; 310; 30; 20 MG/100ML; MG/100ML; MG/100ML; MG/100ML
INJECTION, SOLUTION INTRAVENOUS CONTINUOUS PRN
Status: DISCONTINUED | OUTPATIENT
Start: 2023-11-09 | End: 2023-11-09 | Stop reason: SDUPTHER

## 2023-11-09 RX ORDER — LIDOCAINE HYDROCHLORIDE 20 MG/ML
INJECTION, SOLUTION INTRAVENOUS PRN
Status: DISCONTINUED | OUTPATIENT
Start: 2023-11-09 | End: 2023-11-09 | Stop reason: SDUPTHER

## 2023-11-09 RX ORDER — BUPIVACAINE HYDROCHLORIDE 2.5 MG/ML
INJECTION, SOLUTION EPIDURAL; INFILTRATION; INTRACAUDAL PRN
Status: DISCONTINUED | OUTPATIENT
Start: 2023-11-09 | End: 2023-11-09 | Stop reason: ALTCHOICE

## 2023-11-09 RX ORDER — DEXAMETHASONE SODIUM PHOSPHATE 10 MG/ML
INJECTION INTRAMUSCULAR; INTRAVENOUS PRN
Status: DISCONTINUED | OUTPATIENT
Start: 2023-11-09 | End: 2023-11-09 | Stop reason: SDUPTHER

## 2023-11-09 RX ORDER — PHENYLEPHRINE HCL IN 0.9% NACL 1 MG/10 ML
SYRINGE (ML) INTRAVENOUS PRN
Status: DISCONTINUED | OUTPATIENT
Start: 2023-11-09 | End: 2023-11-09 | Stop reason: SDUPTHER

## 2023-11-09 RX ORDER — SODIUM CHLORIDE 0.9 % (FLUSH) 0.9 %
5-40 SYRINGE (ML) INJECTION EVERY 12 HOURS SCHEDULED
Status: DISCONTINUED | OUTPATIENT
Start: 2023-11-09 | End: 2023-11-09 | Stop reason: HOSPADM

## 2023-11-09 RX ORDER — LIDOCAINE HYDROCHLORIDE AND EPINEPHRINE 5; 5 MG/ML; UG/ML
INJECTION, SOLUTION INFILTRATION; PERINEURAL PRN
Status: DISCONTINUED | OUTPATIENT
Start: 2023-11-09 | End: 2023-11-09 | Stop reason: ALTCHOICE

## 2023-11-09 RX ORDER — ONDANSETRON 2 MG/ML
4 INJECTION INTRAMUSCULAR; INTRAVENOUS
Status: DISCONTINUED | OUTPATIENT
Start: 2023-11-09 | End: 2023-11-09 | Stop reason: HOSPADM

## 2023-11-09 RX ORDER — FENTANYL CITRATE 50 UG/ML
INJECTION, SOLUTION INTRAMUSCULAR; INTRAVENOUS PRN
Status: DISCONTINUED | OUTPATIENT
Start: 2023-11-09 | End: 2023-11-09 | Stop reason: SDUPTHER

## 2023-11-09 RX ORDER — ONDANSETRON 2 MG/ML
INJECTION INTRAMUSCULAR; INTRAVENOUS PRN
Status: DISCONTINUED | OUTPATIENT
Start: 2023-11-09 | End: 2023-11-09 | Stop reason: SDUPTHER

## 2023-11-09 RX ADMIN — LABETALOL HYDROCHLORIDE 5 MG: 5 INJECTION INTRAVENOUS at 09:45

## 2023-11-09 RX ADMIN — DEXAMETHASONE SODIUM PHOSPHATE 10 MG: 10 INJECTION INTRAMUSCULAR; INTRAVENOUS at 08:36

## 2023-11-09 RX ADMIN — Medication 100 MCG: at 11:09

## 2023-11-09 RX ADMIN — ACETAMINOPHEN 650 MG: 325 TABLET ORAL at 20:36

## 2023-11-09 RX ADMIN — ACETAMINOPHEN 650 MG: 325 TABLET ORAL at 02:40

## 2023-11-09 RX ADMIN — METHOCARBAMOL 1000 MG: 500 TABLET ORAL at 15:47

## 2023-11-09 RX ADMIN — ACETAMINOPHEN 650 MG: 325 TABLET ORAL at 15:48

## 2023-11-09 RX ADMIN — FENTANYL CITRATE 50 MCG: 0.05 INJECTION, SOLUTION INTRAMUSCULAR; INTRAVENOUS at 09:07

## 2023-11-09 RX ADMIN — SODIUM CHLORIDE, POTASSIUM CHLORIDE, SODIUM LACTATE AND CALCIUM CHLORIDE: 600; 310; 30; 20 INJECTION, SOLUTION INTRAVENOUS at 08:40

## 2023-11-09 RX ADMIN — SODIUM CHLORIDE: 9 INJECTION, SOLUTION INTRAVENOUS at 05:06

## 2023-11-09 RX ADMIN — METHOCARBAMOL 1000 MG: 500 TABLET ORAL at 20:35

## 2023-11-09 RX ADMIN — FENTANYL CITRATE 50 MCG: 0.05 INJECTION, SOLUTION INTRAMUSCULAR; INTRAVENOUS at 09:22

## 2023-11-09 RX ADMIN — CEFAZOLIN 2000 MG: 2 INJECTION, POWDER, FOR SOLUTION INTRAMUSCULAR; INTRAVENOUS at 08:44

## 2023-11-09 RX ADMIN — SODIUM CHLORIDE, PRESERVATIVE FREE 10 ML: 5 INJECTION INTRAVENOUS at 20:36

## 2023-11-09 RX ADMIN — FENTANYL CITRATE 50 MCG: 0.05 INJECTION, SOLUTION INTRAMUSCULAR; INTRAVENOUS at 09:36

## 2023-11-09 RX ADMIN — SUGAMMADEX 183 MG: 100 INJECTION, SOLUTION INTRAVENOUS at 12:10

## 2023-11-09 RX ADMIN — SODIUM CHLORIDE: 9 INJECTION, SOLUTION INTRAVENOUS at 08:29

## 2023-11-09 RX ADMIN — CEFAZOLIN 2000 MG: 2 INJECTION, POWDER, FOR SOLUTION INTRAMUSCULAR; INTRAVENOUS at 17:55

## 2023-11-09 RX ADMIN — Medication 100 MCG: at 11:43

## 2023-11-09 RX ADMIN — ONDANSETRON 4 MG: 2 INJECTION INTRAMUSCULAR; INTRAVENOUS at 11:37

## 2023-11-09 RX ADMIN — LIDOCAINE HYDROCHLORIDE 80 MG: 20 INJECTION, SOLUTION INTRAVENOUS at 08:36

## 2023-11-09 RX ADMIN — PROPOFOL 180 MG: 10 INJECTION, EMULSION INTRAVENOUS at 08:36

## 2023-11-09 RX ADMIN — ROCURONIUM BROMIDE 30 MG: 10 INJECTION, SOLUTION INTRAVENOUS at 09:36

## 2023-11-09 RX ADMIN — Medication 100 MCG: at 11:19

## 2023-11-09 RX ADMIN — Medication 200 MCG: at 08:53

## 2023-11-09 RX ADMIN — HYDROMORPHONE HYDROCHLORIDE 0.5 MG: 2 INJECTION, SOLUTION INTRAMUSCULAR; INTRAVENOUS; SUBCUTANEOUS at 11:42

## 2023-11-09 RX ADMIN — SODIUM CHLORIDE, POTASSIUM CHLORIDE, SODIUM LACTATE AND CALCIUM CHLORIDE: 600; 310; 30; 20 INJECTION, SOLUTION INTRAVENOUS at 10:50

## 2023-11-09 RX ADMIN — ROCURONIUM BROMIDE 20 MG: 10 INJECTION, SOLUTION INTRAVENOUS at 10:35

## 2023-11-09 RX ADMIN — FENTANYL CITRATE 100 MCG: 0.05 INJECTION, SOLUTION INTRAMUSCULAR; INTRAVENOUS at 08:36

## 2023-11-09 RX ADMIN — ROCURONIUM BROMIDE 50 MG: 10 INJECTION, SOLUTION INTRAVENOUS at 08:36

## 2023-11-09 ASSESSMENT — PAIN DESCRIPTION - DESCRIPTORS
DESCRIPTORS: DULL
DESCRIPTORS: ACHING;SORE;SHARP
DESCRIPTORS: ACHING

## 2023-11-09 ASSESSMENT — PAIN DESCRIPTION - FREQUENCY: FREQUENCY: CONTINUOUS

## 2023-11-09 ASSESSMENT — PAIN SCALES - GENERAL
PAINLEVEL_OUTOF10: 1
PAINLEVEL_OUTOF10: 5
PAINLEVEL_OUTOF10: 4

## 2023-11-09 ASSESSMENT — PAIN DESCRIPTION - ONSET: ONSET: ON-GOING

## 2023-11-09 ASSESSMENT — PAIN - FUNCTIONAL ASSESSMENT: PAIN_FUNCTIONAL_ASSESSMENT: PREVENTS OR INTERFERES SOME ACTIVE ACTIVITIES AND ADLS

## 2023-11-09 ASSESSMENT — PAIN DESCRIPTION - LOCATION
LOCATION: NECK
LOCATION: NECK
LOCATION: GENERALIZED

## 2023-11-09 ASSESSMENT — PAIN DESCRIPTION - ORIENTATION
ORIENTATION: RIGHT;LEFT
ORIENTATION: POSTERIOR
ORIENTATION: OTHER (COMMENT)

## 2023-11-09 ASSESSMENT — PAIN DESCRIPTION - PAIN TYPE: TYPE: ACUTE PAIN

## 2023-11-09 NOTE — ANESTHESIA PROCEDURE NOTES
Arterial Line:    An arterial line was placed using surface landmarks, in the OR for the following indication(s): continuous blood pressure monitoring and blood sampling needed. A 20 gauge (size), 1 and 3/4 inch (length) (type) catheter was placed, Seldinger technique used, into the left radial artery, secured by tape and Tegaderm. Events:  patient tolerated procedure well with no complications. 11/9/2023 8:40 AM11/9/2023 8:45 AM  Preanesthetic Checklist  Completed: patient identified, IV checked, site marked, risks and benefits discussed, surgical/procedural consents, equipment checked, pre-op evaluation, timeout performed, anesthesia consent given, oxygen available and monitors applied/VS acknowledged

## 2023-11-09 NOTE — ANESTHESIA POSTPROCEDURE EVALUATION
Department of Anesthesiology  Postprocedure Note    Patient: Doug Kim  MRN: [id number]  YOB: 1940  Date of evaluation: 11/9/2023      Procedure Summary     Date: 11/09/23 Room / Location: [facility] OR 06 / [facility]    Anesthesia Start: 0820 Anesthesia Stop: 7346    Procedure: POSTERIOR C4-T1 FUSION (Back) Diagnosis:       Stress fracture of cervical vertebra, initial encounter      (Stress fracture of cervical vertebra, initial encounter [H07.01IS])    Surgeons: Lamonte Lewis MD Responsible Provider: Meaghan Mcleod MD    Anesthesia Type: General ASA Status: 2          Anesthesia Type: General    Zaid Phase I: Zaid Score: 8    Zaid Phase II:        Anesthesia Post Evaluation    Patient location during evaluation: PACU  Patient participation: complete - patient participated  Level of consciousness: awake and alert  Pain score: 2  Airway patency: patent  Nausea & Vomiting: no nausea and no vomiting  Complications: no  Cardiovascular status: blood pressure returned to baseline  Respiratory status: acceptable  Hydration status: euvolemic  Pain management: adequate

## 2023-11-09 NOTE — BRIEF OP NOTE
Brief Postoperative Note      Patient: Vikki Tavera  YOB: 1940  MRN: [id number]    Date of Procedure: 11/9/2023    Pre-Op Diagnosis Codes:     * Stress fracture of cervical vertebra, initial encounter [M48.42XA]    Post-Op Diagnosis: Same       Procedure(s):  POSTERIOR C4-T1 FUSION    Surgeon(s):  Linda Harrell MD    Assistant:  Resident: Alpesh Macdonald DO    Anesthesia: General    Estimated Blood Loss (mL): 591    Complications: None    Specimens:   * No specimens in log *    Implants:  Implant Name Type Inv. Item Serial No.  Lot No. LRB No. Used Action   GRAFT BONE SUB 10CC DEMIN BONE MTRX PLUSXEMPLIFI - [id number]  GRAFT BONE SUB 10CC DEMIN BONE MTRX PLUSXEMPLIFI [id number] [employer]  N/A 1 Implanted   SCREW SPNL L14MM DIA3. 5MM POLYAX QUARTEX - [id number]  SCREW SPNL L14MM DIA3. 5MM POLYAX QUARTEX  [employer]  N/A 7 Implanted   SCREW QUARTEX 5.5MM POLYAX 28MM IMPL - [id number]  SCREW QUARTEX 5.5MM POLYAX 28MM IMPL  [employer]  N/A 2 Implanted   CAP SPNL OCCIPITOCERVICOTHORACIC KEITH THRD QUARTEX - [id number]  CAP SPNL OCCIPITOCERVICOTHORACIC KEITH THRD QUARTEX  [id number]  N/A 9 Implanted   KURT SPNL L80MM DIA3.5MM CVD CAPITOL - [id number]  KURT SPNL L80MM DIA3.5MM CVD CAPITOL  [employer]  N/A 2 Implanted         Drains:   Urinary Catheter 11/09/23 Coude (Active)       External Urinary Catheter (Active)   Site Assessment Clean,dry & intact 11/08/23 2302   Placement Repositioned 11/08/23 2302   Securement Method Securing device (Describe) 11/08/23 2302   Catheter Care Suction Canister/Tubing changed 11/08/23 1837   Suction 40 mmgHg continuous 11/08/23 2302   Urine Color Yellow 11/08/23 2302   Urine Appearance Clear 11/08/23 2302   Urine Odor Malodorous 11/08/23 2302   Output (mL) 300 mL 11/09/23 0618       Findings: see dictated op note      Electronically signed by Alessandra Holt MD on 11/9/2023 at 12:07 PM

## 2023-11-09 NOTE — ANESTHESIA POSTPROCEDURE EVALUATION
Department of Anesthesiology  Postprocedure Note    Patient: Ant Monique  MRN: [id number]  YOB: 1940  Date of evaluation: 11/9/2023      Procedure Summary     Date: 11/09/23 Room / Location: [facility] OR 06 / [facility]    Anesthesia Start: 0820 Anesthesia Stop: 0817    Procedure: POSTERIOR C4-T1 FUSION (Back) Diagnosis:       Stress fracture of cervical vertebra, initial encounter      (Stress fracture of cervical vertebra, initial encounter [E94.20MP])    Surgeons: Erica Sellers MD Responsible Provider: Shawn Dodge MD    Anesthesia Type: General ASA Status: 2          Anesthesia Type: General    Zaid Phase I: Zaid Score: 8    Zaid Phase II:        Anesthesia Post Evaluation    Patient location during evaluation: PACU  Patient participation: complete - patient participated  Level of consciousness: awake and alert  Pain score: 2  Airway patency: patent  Nausea & Vomiting: no nausea and no vomiting  Complications: no  Cardiovascular status: blood pressure returned to baseline  Respiratory status: acceptable  Hydration status: euvolemic  Pain management: adequate

## 2023-11-09 NOTE — ANESTHESIA PRE PROCEDURE
Applicable):  No results found for: \"LABABO\", \"LABRH\"    Drug/Infectious Status (If Applicable):  No results found for: \"HIV\", \"HEPCAB\"    COVID-19 Screening (If Applicable): No results found for: \"COVID19\"        Anesthesia Evaluation  Patient summary reviewed and Nursing notes reviewed no history of anesthetic complications:   Airway: Mallampati: II  TM distance: >3 FB   Neck ROM: limited  Comment: Patient has cervical spine collar  Mouth opening: > = 3 FB   Dental: normal exam     Comment: None loose    Pulmonary:normal exam  breath sounds clear to auscultation                             Cardiovascular:  Exercise tolerance: good (>4 METS),           Rhythm: regular  Rate: normal                    Neuro/Psych:   Negative Neuro/Psych ROS              GI/Hepatic/Renal: Neg GI/Hepatic/Renal ROS            Endo/Other:                      ROS comment: Prostate Ca    Hand laceration Abdominal:             Vascular: negative vascular ROS. Other Findings:             Anesthesia Plan      general     ASA 2       Induction: intravenous. arterial line and continuous noninvasive hemodynamic monitor  MIPS: Postoperative opioids intended and Prophylactic antiemetics administered. Anesthetic plan and risks discussed with patient. Use of blood products discussed with patient whom consented to blood products. Plan discussed with CRNA.                     Beulah Henao MD   11/8/2023

## 2023-11-10 PROBLEM — S09.90XA HEAD INJURY: Status: ACTIVE | Noted: 2023-11-10

## 2023-11-10 LAB
ANION GAP SERPL CALCULATED.3IONS-SCNC: 10 MMOL/L (ref 7–16)
BASOPHILS # BLD: 0 K/UL (ref 0–0.2)
BASOPHILS NFR BLD: 0 % (ref 0–2)
BUN SERPL-MCNC: 14 MG/DL (ref 6–23)
CALCIUM SERPL-MCNC: 8.6 MG/DL (ref 8.6–10.2)
CHLORIDE SERPL-SCNC: 107 MMOL/L (ref 98–107)
CO2 SERPL-SCNC: 23 MMOL/L (ref 22–29)
CREAT SERPL-MCNC: 0.7 MG/DL (ref 0.7–1.2)
EOSINOPHIL # BLD: 0 K/UL (ref 0.05–0.5)
EOSINOPHILS RELATIVE PERCENT: 0 % (ref 0–6)
ERYTHROCYTE [DISTWIDTH] IN BLOOD BY AUTOMATED COUNT: 13.3 % (ref 11.5–15)
GFR SERPL CREATININE-BSD FRML MDRD: >60 ML/MIN/1.73M2
GLUCOSE SERPL-MCNC: 119 MG/DL (ref 74–99)
HCT VFR BLD AUTO: 35.1 % (ref 37–54)
HGB BLD-MCNC: 11.4 G/DL (ref 12.5–16.5)
LYMPHOCYTES NFR BLD: 0.26 K/UL (ref 1.5–4)
LYMPHOCYTES RELATIVE PERCENT: 2 % (ref 20–42)
MCH RBC QN AUTO: 30.2 PG (ref 26–35)
MCHC RBC AUTO-ENTMCNC: 32.5 G/DL (ref 32–34.5)
MCV RBC AUTO: 93.1 FL (ref 80–99.9)
MICROORGANISM SPEC CULT: ABNORMAL
MICROORGANISM SPEC CULT: ABNORMAL
MONOCYTES NFR BLD: 1.45 K/UL (ref 0.1–0.95)
MONOCYTES NFR BLD: 10 % (ref 2–12)
NEUTROPHILS NFR BLD: 89 % (ref 43–80)
NEUTS SEG NFR BLD: 13.48 K/UL (ref 1.8–7.3)
PLATELET # BLD AUTO: 216 K/UL (ref 130–450)
PMV BLD AUTO: 10 FL (ref 7–12)
POTASSIUM SERPL-SCNC: 4.1 MMOL/L (ref 3.5–5)
RBC # BLD AUTO: 3.77 M/UL (ref 3.8–5.8)
RBC # BLD: ABNORMAL 10*6/UL
RBC # BLD: ABNORMAL 10*6/UL
SODIUM SERPL-SCNC: 140 MMOL/L (ref 132–146)
SPECIMEN DESCRIPTION: ABNORMAL
WBC OTHER # BLD: 15.2 K/UL (ref 4.5–11.5)

## 2023-11-10 PROCEDURE — 0RG4071 FUSION OF CERVICOTHORACIC VERTEBRAL JOINT WITH AUTOLOGOUS TISSUE SUBSTITUTE, POSTERIOR APPROACH, POSTERIOR COLUMN, OPEN APPROACH: ICD-10-PCS | Performed by: NEUROLOGICAL SURGERY

## 2023-11-10 PROCEDURE — 6370000000 HC RX 637 (ALT 250 FOR IP): Performed by: STUDENT IN AN ORGANIZED HEALTH CARE EDUCATION/TRAINING PROGRAM

## 2023-11-10 PROCEDURE — 80048 BASIC METABOLIC PNL TOTAL CA: CPT

## 2023-11-10 PROCEDURE — 36415 COLL VENOUS BLD VENIPUNCTURE: CPT

## 2023-11-10 PROCEDURE — 6360000002 HC RX W HCPCS: Performed by: NEUROLOGICAL SURGERY

## 2023-11-10 PROCEDURE — 1200000000 HC SEMI PRIVATE

## 2023-11-10 PROCEDURE — 2580000003 HC RX 258: Performed by: NEUROLOGICAL SURGERY

## 2023-11-10 PROCEDURE — 97535 SELF CARE MNGMENT TRAINING: CPT

## 2023-11-10 PROCEDURE — 2700000000 HC OXYGEN THERAPY PER DAY

## 2023-11-10 PROCEDURE — 85025 COMPLETE CBC W/AUTO DIFF WBC: CPT

## 2023-11-10 PROCEDURE — 2580000003 HC RX 258: Performed by: STUDENT IN AN ORGANIZED HEALTH CARE EDUCATION/TRAINING PROGRAM

## 2023-11-10 PROCEDURE — 97165 OT EVAL LOW COMPLEX 30 MIN: CPT

## 2023-11-10 PROCEDURE — 97161 PT EVAL LOW COMPLEX 20 MIN: CPT

## 2023-11-10 PROCEDURE — 0RG2071 FUSION OF 2 OR MORE CERVICAL VERTEBRAL JOINTS WITH AUTOLOGOUS TISSUE SUBSTITUTE, POSTERIOR APPROACH, POSTERIOR COLUMN, OPEN APPROACH: ICD-10-PCS | Performed by: NEUROLOGICAL SURGERY

## 2023-11-10 PROCEDURE — 97530 THERAPEUTIC ACTIVITIES: CPT

## 2023-11-10 PROCEDURE — 99232 SBSQ HOSP IP/OBS MODERATE 35: CPT | Performed by: SURGERY

## 2023-11-10 RX ORDER — PANTOPRAZOLE SODIUM 40 MG/1
40 TABLET, DELAYED RELEASE ORAL
Status: DISCONTINUED | OUTPATIENT
Start: 2023-11-10 | End: 2023-11-13 | Stop reason: HOSPADM

## 2023-11-10 RX ADMIN — METHOCARBAMOL 1000 MG: 500 TABLET ORAL at 09:30

## 2023-11-10 RX ADMIN — CEFAZOLIN 2000 MG: 2 INJECTION, POWDER, FOR SOLUTION INTRAMUSCULAR; INTRAVENOUS at 17:30

## 2023-11-10 RX ADMIN — ACETAMINOPHEN 650 MG: 325 TABLET ORAL at 01:56

## 2023-11-10 RX ADMIN — CEFAZOLIN 2000 MG: 2 INJECTION, POWDER, FOR SOLUTION INTRAMUSCULAR; INTRAVENOUS at 09:31

## 2023-11-10 RX ADMIN — ACETAMINOPHEN 650 MG: 325 TABLET ORAL at 20:52

## 2023-11-10 RX ADMIN — CEFAZOLIN 2000 MG: 2 INJECTION, POWDER, FOR SOLUTION INTRAMUSCULAR; INTRAVENOUS at 00:34

## 2023-11-10 RX ADMIN — METHOCARBAMOL 1000 MG: 500 TABLET ORAL at 20:52

## 2023-11-10 RX ADMIN — ACETAMINOPHEN 650 MG: 325 TABLET ORAL at 14:34

## 2023-11-10 RX ADMIN — SODIUM CHLORIDE, PRESERVATIVE FREE 10 ML: 5 INJECTION INTRAVENOUS at 09:32

## 2023-11-10 RX ADMIN — METHOCARBAMOL 1000 MG: 500 TABLET ORAL at 12:54

## 2023-11-10 RX ADMIN — SODIUM CHLORIDE, PRESERVATIVE FREE 10 ML: 5 INJECTION INTRAVENOUS at 20:53

## 2023-11-10 RX ADMIN — OXYCODONE HYDROCHLORIDE 10 MG: 10 TABLET ORAL at 10:46

## 2023-11-10 RX ADMIN — ACETAMINOPHEN 650 MG: 325 TABLET ORAL at 09:30

## 2023-11-10 RX ADMIN — OXYCODONE 5 MG: 5 TABLET ORAL at 16:17

## 2023-11-10 RX ADMIN — METHOCARBAMOL 1000 MG: 500 TABLET ORAL at 17:30

## 2023-11-10 RX ADMIN — PANTOPRAZOLE SODIUM 40 MG: 40 TABLET, DELAYED RELEASE ORAL at 06:20

## 2023-11-10 ASSESSMENT — PAIN DESCRIPTION - LOCATION
LOCATION: NECK
LOCATION: BACK;NECK

## 2023-11-10 ASSESSMENT — PAIN - FUNCTIONAL ASSESSMENT: PAIN_FUNCTIONAL_ASSESSMENT: PREVENTS OR INTERFERES SOME ACTIVE ACTIVITIES AND ADLS

## 2023-11-10 ASSESSMENT — PAIN DESCRIPTION - ONSET: ONSET: ON-GOING

## 2023-11-10 ASSESSMENT — PAIN DESCRIPTION - DESCRIPTORS
DESCRIPTORS: ACHING;THROBBING
DESCRIPTORS: SHOOTING;THROBBING
DESCRIPTORS: ACHING
DESCRIPTORS: PRESSURE;ACHING;DISCOMFORT

## 2023-11-10 ASSESSMENT — PAIN DESCRIPTION - FREQUENCY: FREQUENCY: CONTINUOUS

## 2023-11-10 ASSESSMENT — PAIN SCALES - GENERAL
PAINLEVEL_OUTOF10: 8
PAINLEVEL_OUTOF10: 5
PAINLEVEL_OUTOF10: 6
PAINLEVEL_OUTOF10: 5
PAINLEVEL_OUTOF10: 6
PAINLEVEL_OUTOF10: 0

## 2023-11-10 ASSESSMENT — PAIN DESCRIPTION - ORIENTATION: ORIENTATION: RIGHT;LEFT;MID

## 2023-11-10 ASSESSMENT — PAIN DESCRIPTION - PAIN TYPE: TYPE: ACUTE PAIN

## 2023-11-10 NOTE — OP NOTE
[address], [address] [place] [address]                                OPERATIVE REPORT    PATIENT NAME: Rian Rodriguez                      :        1940  MED REC NO:   [id number]                            ROOM:       8416  ACCOUNT NO:   [de-identified]                           ADMIT DATE: 2023  PROVIDER:     Tre Núñez MD    DATE OF PROCEDURE:  2023    PREOPERATIVE DIAGNOSIS:  C6-C7 fracture dislocation. POSTOPERATIVE DIAGNOSIS:  C6-C7 fracture dislocation. OPERATIVE PROCEDURES:  1. Closed reduction of C6-C7 fracture dislocation with placement of  Kinney-Wells tongs and 40 pounds of weight. 2.  Bilateral segmental arthrodesis and fusions from C3 to T1 with the  use of bilateral C3, C4, C5 and right-sided C6 lateral mass screws and  bilateral T1 pedicle screws with the use of locally harvested autograft  plus allograft in the form of XEMPLIFI putty for posterolateral fusion  from C3 to T1.  3.  Bilateral C3, C4, C5, C6 and C7 laminectomy. 4.  Use of O-arm assisted navigation with placement of T1 pedicle  screws. ANESTHESIA:  Generalized endotracheal anesthesia. SURGEON:  Tre Núñez MD    ASSISTANT:  Sin Boone DO    COMPLICATIONS:  None. ESTIMATED BLOOD LOSS:  500 mL. SPECIMEN:  None. OPERATIVE INDICATIONS:  The patient is an 79-year-old gentleman who fell  in the shower yesterday. He developed C6-C7 fracture dislocation. After risks, benefits and alternatives were discussed with the patient,  it was determined that he would undergo the above-listed procedure. OPERATIVE PROCEDURE IN DETAIL:  The patient was brought into the  operating room. A time-out was performed where he was identified by his  name, medical record number, and the operative procedure, which he was  about to undergo. Next, induction of generalized endotracheal  anesthesia was then commenced.   Upon completion of

## 2023-11-10 NOTE — PLAN OF CARE
Problem: Safety - Adult  Goal: Free from fall injury  11/10/2023 1106 by Eduardo Gallegos RN  Outcome: Progressing  11/9/2023 2118 by Flor Gallegos RN  Outcome: Progressing

## 2023-11-10 NOTE — ACP (ADVANCE CARE PLANNING)
Advance Care Planning   Healthcare Decision Maker:    Primary Decision Maker: Pratibha Wood - Spouse - [phone]    Secondary Decision Maker: Maile Horvath - Child - [phone]    Click here to complete Healthcare Decision Makers including selection of the Healthcare Decision Maker Relationship (ie \"Primary\"). Today we documented Decision Maker(s) consistent with Legal Next of Kin hierarchy.        If the relationship to the patient does NOT follow our state's Next of Kin hierarchy, the patient MUST complete an ACP Document to allow him/her to act on the patient's behalf. :

## 2023-11-10 NOTE — CARE COORDINATION
CM transition of care. Pt presented to [address] [facility] ER as transfer from [facility] secondary to a fall at home in the shower. Found to have a C6-C7 fracture with grade 2 subluxation. NS consulted. POD # 1 s/p C3-T1 fusion. Custom collar for 3 months. Drain to be removed on Sunday. Met with pt at bedside to discuss d/c planning. Pt lives with his wife in a 2 story home with ramp access, with first floor set up. PTA pt is independent, driving, and working full time as a farmer. He reports that he provides care for his wife at home. Has no hx of DME, HHC, or JAVY. PCP is Dr Cori Limon and uses [address] [facility] in [facility]. Pt is wanting to return to home at d/c. His daughter will be able to stay with them. Will await pt/ot ju to assist with d/c planning. CM/SW to follow. Roxanna Nguyen RN [facility] [phone].

## 2023-11-11 LAB
ANION GAP SERPL CALCULATED.3IONS-SCNC: 14 MMOL/L (ref 7–16)
BASOPHILS # BLD: 0.06 K/UL (ref 0–0.2)
BASOPHILS NFR BLD: 1 % (ref 0–2)
BUN SERPL-MCNC: 16 MG/DL (ref 6–23)
CALCIUM SERPL-MCNC: 8.6 MG/DL (ref 8.6–10.2)
CHLORIDE SERPL-SCNC: 103 MMOL/L (ref 98–107)
CO2 SERPL-SCNC: 22 MMOL/L (ref 22–29)
CREAT SERPL-MCNC: 0.6 MG/DL (ref 0.7–1.2)
EOSINOPHIL # BLD: 0.05 K/UL (ref 0.05–0.5)
EOSINOPHILS RELATIVE PERCENT: 0 % (ref 0–6)
ERYTHROCYTE [DISTWIDTH] IN BLOOD BY AUTOMATED COUNT: 13.5 % (ref 11.5–15)
GFR SERPL CREATININE-BSD FRML MDRD: >60 ML/MIN/1.73M2
GLUCOSE SERPL-MCNC: 121 MG/DL (ref 74–99)
HCT VFR BLD AUTO: 35.4 % (ref 37–54)
HGB BLD-MCNC: 11.5 G/DL (ref 12.5–16.5)
IMM GRANULOCYTES # BLD AUTO: 0.09 K/UL (ref 0–0.58)
IMM GRANULOCYTES NFR BLD: 1 % (ref 0–5)
LYMPHOCYTES NFR BLD: 0.86 K/UL (ref 1.5–4)
LYMPHOCYTES RELATIVE PERCENT: 7 % (ref 20–42)
MCH RBC QN AUTO: 30.1 PG (ref 26–35)
MCHC RBC AUTO-ENTMCNC: 32.5 G/DL (ref 32–34.5)
MCV RBC AUTO: 92.7 FL (ref 80–99.9)
MONOCYTES NFR BLD: 1.28 K/UL (ref 0.1–0.95)
MONOCYTES NFR BLD: 11 % (ref 2–12)
NEUTROPHILS NFR BLD: 80 % (ref 43–80)
NEUTS SEG NFR BLD: 9.45 K/UL (ref 1.8–7.3)
PLATELET # BLD AUTO: 215 K/UL (ref 130–450)
PMV BLD AUTO: 9.9 FL (ref 7–12)
POTASSIUM SERPL-SCNC: 3.9 MMOL/L (ref 3.5–5)
RBC # BLD AUTO: 3.82 M/UL (ref 3.8–5.8)
SODIUM SERPL-SCNC: 139 MMOL/L (ref 132–146)
WBC OTHER # BLD: 11.8 K/UL (ref 4.5–11.5)

## 2023-11-11 PROCEDURE — 6360000002 HC RX W HCPCS: Performed by: NEUROLOGICAL SURGERY

## 2023-11-11 PROCEDURE — 2580000003 HC RX 258: Performed by: NEUROLOGICAL SURGERY

## 2023-11-11 PROCEDURE — 1200000000 HC SEMI PRIVATE

## 2023-11-11 PROCEDURE — 80048 BASIC METABOLIC PNL TOTAL CA: CPT

## 2023-11-11 PROCEDURE — 99232 SBSQ HOSP IP/OBS MODERATE 35: CPT | Performed by: SURGERY

## 2023-11-11 PROCEDURE — 2580000003 HC RX 258: Performed by: STUDENT IN AN ORGANIZED HEALTH CARE EDUCATION/TRAINING PROGRAM

## 2023-11-11 PROCEDURE — 85025 COMPLETE CBC W/AUTO DIFF WBC: CPT

## 2023-11-11 PROCEDURE — 36415 COLL VENOUS BLD VENIPUNCTURE: CPT

## 2023-11-11 PROCEDURE — 6370000000 HC RX 637 (ALT 250 FOR IP): Performed by: STUDENT IN AN ORGANIZED HEALTH CARE EDUCATION/TRAINING PROGRAM

## 2023-11-11 RX ADMIN — ACETAMINOPHEN 650 MG: 325 TABLET ORAL at 02:00

## 2023-11-11 RX ADMIN — CEFAZOLIN 2000 MG: 2 INJECTION, POWDER, FOR SOLUTION INTRAMUSCULAR; INTRAVENOUS at 08:05

## 2023-11-11 RX ADMIN — ACETAMINOPHEN 650 MG: 325 TABLET ORAL at 14:36

## 2023-11-11 RX ADMIN — CEFAZOLIN 2000 MG: 2 INJECTION, POWDER, FOR SOLUTION INTRAMUSCULAR; INTRAVENOUS at 17:26

## 2023-11-11 RX ADMIN — SODIUM CHLORIDE, PRESERVATIVE FREE 10 ML: 5 INJECTION INTRAVENOUS at 20:41

## 2023-11-11 RX ADMIN — METHOCARBAMOL 1000 MG: 500 TABLET ORAL at 08:05

## 2023-11-11 RX ADMIN — METHOCARBAMOL 1000 MG: 500 TABLET ORAL at 20:40

## 2023-11-11 RX ADMIN — SODIUM CHLORIDE, PRESERVATIVE FREE 10 ML: 5 INJECTION INTRAVENOUS at 08:43

## 2023-11-11 RX ADMIN — POLYETHYLENE GLYCOL 3350 17 G: 17 POWDER, FOR SOLUTION ORAL at 08:05

## 2023-11-11 RX ADMIN — ACETAMINOPHEN 650 MG: 325 TABLET ORAL at 20:40

## 2023-11-11 RX ADMIN — METHOCARBAMOL 1000 MG: 500 TABLET ORAL at 12:34

## 2023-11-11 RX ADMIN — CEFAZOLIN 2000 MG: 2 INJECTION, POWDER, FOR SOLUTION INTRAMUSCULAR; INTRAVENOUS at 01:50

## 2023-11-11 RX ADMIN — ACETAMINOPHEN 650 MG: 325 TABLET ORAL at 08:04

## 2023-11-11 RX ADMIN — METHOCARBAMOL 1000 MG: 500 TABLET ORAL at 17:26

## 2023-11-11 RX ADMIN — PANTOPRAZOLE SODIUM 40 MG: 40 TABLET, DELAYED RELEASE ORAL at 05:16

## 2023-11-11 ASSESSMENT — PAIN DESCRIPTION - LOCATION
LOCATION: NECK;BACK
LOCATION: NECK;BACK

## 2023-11-11 ASSESSMENT — PAIN DESCRIPTION - DESCRIPTORS
DESCRIPTORS: ACHING;SORE;SHARP
DESCRIPTORS: ACHING;SORE;THROBBING

## 2023-11-11 ASSESSMENT — PAIN DESCRIPTION - ORIENTATION
ORIENTATION: MID;RIGHT;LEFT
ORIENTATION: MID;RIGHT;LEFT

## 2023-11-11 ASSESSMENT — PAIN SCALES - GENERAL
PAINLEVEL_OUTOF10: 5
PAINLEVEL_OUTOF10: 4

## 2023-11-11 NOTE — PLAN OF CARE
Problem: Safety - Adult  Goal: Free from fall injury  11/11/2023 0855 by Destiny Lopez RN  Outcome: Progressing  11/10/2023 2152 by Kanchan Shah RN  Outcome: Progressing     Problem: Pain  Goal: Verbalizes/displays adequate comfort level or baseline comfort level  11/11/2023 0855 by Destiny Lopez RN  Outcome: Progressing  11/10/2023 2152 by Kanchan Shah RN  Outcome: Progressing     Problem: ABCDS Injury Assessment  Goal: Absence of physical injury  11/11/2023 0855 by Destiny Lopez RN  Outcome: Progressing  11/10/2023 2152 by Kanchan Shah RN  Outcome: Progressing     Problem: Discharge Planning  Goal: Discharge to home or other facility with appropriate resources  11/11/2023 0855 by Destiny Lopez RN  Outcome: Progressing  11/10/2023 2152 by Kanchan Shah RN  Outcome: Progressing     Problem: Skin/Tissue Integrity  Goal: Absence of new skin breakdown  Description: 1. Monitor for areas of redness and/or skin breakdown  2. Assess vascular access sites hourly  3. Every 4-6 hours minimum:  Change oxygen saturation probe site  4. Every 4-6 hours:  If on nasal continuous positive airway pressure, respiratory therapy assess nares and determine need for appliance change or resting period.   11/11/2023 0855 by Destiny Lopez RN  Outcome: Progressing  11/10/2023 2152 by Kanchan Shah RN  Outcome: Progressing

## 2023-11-12 LAB
ANION GAP SERPL CALCULATED.3IONS-SCNC: 14 MMOL/L (ref 7–16)
BASOPHILS # BLD: 0.09 K/UL (ref 0–0.2)
BASOPHILS NFR BLD: 1 % (ref 0–2)
BUN SERPL-MCNC: 17 MG/DL (ref 6–23)
CALCIUM SERPL-MCNC: 8.8 MG/DL (ref 8.6–10.2)
CHLORIDE SERPL-SCNC: 103 MMOL/L (ref 98–107)
CO2 SERPL-SCNC: 22 MMOL/L (ref 22–29)
CREAT SERPL-MCNC: 0.6 MG/DL (ref 0.7–1.2)
EOSINOPHIL # BLD: 0.12 K/UL (ref 0.05–0.5)
EOSINOPHILS RELATIVE PERCENT: 1 % (ref 0–6)
ERYTHROCYTE [DISTWIDTH] IN BLOOD BY AUTOMATED COUNT: 13.3 % (ref 11.5–15)
GFR SERPL CREATININE-BSD FRML MDRD: >60 ML/MIN/1.73M2
GLUCOSE SERPL-MCNC: 117 MG/DL (ref 74–99)
HCT VFR BLD AUTO: 37 % (ref 37–54)
HGB BLD-MCNC: 11.5 G/DL (ref 12.5–16.5)
IMM GRANULOCYTES # BLD AUTO: 0.14 K/UL (ref 0–0.58)
IMM GRANULOCYTES NFR BLD: 1 % (ref 0–5)
LYMPHOCYTES NFR BLD: 0.66 K/UL (ref 1.5–4)
LYMPHOCYTES RELATIVE PERCENT: 6 % (ref 20–42)
MCH RBC QN AUTO: 29 PG (ref 26–35)
MCHC RBC AUTO-ENTMCNC: 31.1 G/DL (ref 32–34.5)
MCV RBC AUTO: 93.4 FL (ref 80–99.9)
MONOCYTES NFR BLD: 1.04 K/UL (ref 0.1–0.95)
MONOCYTES NFR BLD: 10 % (ref 2–12)
NEUTROPHILS NFR BLD: 81 % (ref 43–80)
NEUTS SEG NFR BLD: 8.71 K/UL (ref 1.8–7.3)
PLATELET # BLD AUTO: 258 K/UL (ref 130–450)
PMV BLD AUTO: 9.6 FL (ref 7–12)
POTASSIUM SERPL-SCNC: 4.2 MMOL/L (ref 3.5–5)
RBC # BLD AUTO: 3.96 M/UL (ref 3.8–5.8)
SODIUM SERPL-SCNC: 139 MMOL/L (ref 132–146)
WBC OTHER # BLD: 10.8 K/UL (ref 4.5–11.5)

## 2023-11-12 PROCEDURE — 99232 SBSQ HOSP IP/OBS MODERATE 35: CPT | Performed by: SURGERY

## 2023-11-12 PROCEDURE — 6370000000 HC RX 637 (ALT 250 FOR IP): Performed by: STUDENT IN AN ORGANIZED HEALTH CARE EDUCATION/TRAINING PROGRAM

## 2023-11-12 PROCEDURE — 6360000002 HC RX W HCPCS

## 2023-11-12 PROCEDURE — 6370000000 HC RX 637 (ALT 250 FOR IP)

## 2023-11-12 PROCEDURE — 36415 COLL VENOUS BLD VENIPUNCTURE: CPT

## 2023-11-12 PROCEDURE — 97530 THERAPEUTIC ACTIVITIES: CPT

## 2023-11-12 PROCEDURE — 2580000003 HC RX 258: Performed by: STUDENT IN AN ORGANIZED HEALTH CARE EDUCATION/TRAINING PROGRAM

## 2023-11-12 PROCEDURE — 2580000003 HC RX 258: Performed by: NEUROLOGICAL SURGERY

## 2023-11-12 PROCEDURE — 85025 COMPLETE CBC W/AUTO DIFF WBC: CPT

## 2023-11-12 PROCEDURE — 80048 BASIC METABOLIC PNL TOTAL CA: CPT

## 2023-11-12 PROCEDURE — 6360000002 HC RX W HCPCS: Performed by: NEUROLOGICAL SURGERY

## 2023-11-12 PROCEDURE — 1200000000 HC SEMI PRIVATE

## 2023-11-12 RX ORDER — METHOCARBAMOL 1000 MG/1
1000 TABLET, COATED ORAL 4 TIMES DAILY
Qty: 40 TABLET | Refills: 0 | Status: SHIPPED | OUTPATIENT
Start: 2023-11-12 | End: 2023-11-22

## 2023-11-12 RX ORDER — OXYCODONE HYDROCHLORIDE AND ACETAMINOPHEN 5; 325 MG/1; MG/1
1 TABLET ORAL EVERY 6 HOURS PRN
Qty: 28 TABLET | Refills: 0 | Status: SHIPPED | OUTPATIENT
Start: 2023-11-12 | End: 2023-11-19

## 2023-11-12 RX ORDER — HEPARIN SODIUM 10000 [USP'U]/ML
5000 INJECTION, SOLUTION INTRAVENOUS; SUBCUTANEOUS EVERY 8 HOURS SCHEDULED
Status: DISCONTINUED | OUTPATIENT
Start: 2023-11-12 | End: 2023-11-13 | Stop reason: HOSPADM

## 2023-11-12 RX ADMIN — ACETAMINOPHEN 650 MG: 325 TABLET ORAL at 03:49

## 2023-11-12 RX ADMIN — PANTOPRAZOLE SODIUM 40 MG: 40 TABLET, DELAYED RELEASE ORAL at 05:18

## 2023-11-12 RX ADMIN — ACETAMINOPHEN 650 MG: 325 TABLET ORAL at 20:21

## 2023-11-12 RX ADMIN — HEPARIN SODIUM 5000 UNITS: 10000 INJECTION INTRAVENOUS; SUBCUTANEOUS at 08:28

## 2023-11-12 RX ADMIN — METHOCARBAMOL 1000 MG: 500 TABLET ORAL at 12:55

## 2023-11-12 RX ADMIN — METHOCARBAMOL 1000 MG: 500 TABLET ORAL at 20:21

## 2023-11-12 RX ADMIN — CEFAZOLIN 2000 MG: 2 INJECTION, POWDER, FOR SOLUTION INTRAMUSCULAR; INTRAVENOUS at 01:26

## 2023-11-12 RX ADMIN — HEPARIN SODIUM 5000 UNITS: 10000 INJECTION INTRAVENOUS; SUBCUTANEOUS at 20:21

## 2023-11-12 RX ADMIN — HEPARIN SODIUM 5000 UNITS: 10000 INJECTION INTRAVENOUS; SUBCUTANEOUS at 15:15

## 2023-11-12 RX ADMIN — SODIUM CHLORIDE, PRESERVATIVE FREE 10 ML: 5 INJECTION INTRAVENOUS at 08:24

## 2023-11-12 RX ADMIN — ACETAMINOPHEN 650 MG: 325 TABLET ORAL at 15:14

## 2023-11-12 RX ADMIN — MAGNESIUM HYDROXIDE 30 ML: 400 SUSPENSION ORAL at 08:24

## 2023-11-12 RX ADMIN — OXYCODONE HYDROCHLORIDE 10 MG: 10 TABLET ORAL at 08:28

## 2023-11-12 RX ADMIN — POLYETHYLENE GLYCOL 3350 17 G: 17 POWDER, FOR SOLUTION ORAL at 08:24

## 2023-11-12 RX ADMIN — ACETAMINOPHEN 650 MG: 325 TABLET ORAL at 08:24

## 2023-11-12 RX ADMIN — METHOCARBAMOL 1000 MG: 500 TABLET ORAL at 08:24

## 2023-11-12 RX ADMIN — CEFAZOLIN 2000 MG: 2 INJECTION, POWDER, FOR SOLUTION INTRAMUSCULAR; INTRAVENOUS at 08:24

## 2023-11-12 RX ADMIN — SODIUM CHLORIDE, PRESERVATIVE FREE 10 ML: 5 INJECTION INTRAVENOUS at 20:21

## 2023-11-12 RX ADMIN — METHOCARBAMOL 1000 MG: 500 TABLET ORAL at 17:29

## 2023-11-12 ASSESSMENT — PAIN DESCRIPTION - ORIENTATION
ORIENTATION: LEFT;UPPER
ORIENTATION: UPPER;LEFT
ORIENTATION: UPPER

## 2023-11-12 ASSESSMENT — PAIN - FUNCTIONAL ASSESSMENT
PAIN_FUNCTIONAL_ASSESSMENT: PREVENTS OR INTERFERES SOME ACTIVE ACTIVITIES AND ADLS

## 2023-11-12 ASSESSMENT — PAIN DESCRIPTION - DESCRIPTORS
DESCRIPTORS: ACHING;CRAMPING;DISCOMFORT
DESCRIPTORS: ACHING;DISCOMFORT
DESCRIPTORS: ACHING;CRAMPING;DISCOMFORT
DESCRIPTORS: BURNING;CRAMPING;DISCOMFORT

## 2023-11-12 ASSESSMENT — PAIN SCALES - GENERAL
PAINLEVEL_OUTOF10: 4
PAINLEVEL_OUTOF10: 2
PAINLEVEL_OUTOF10: 2
PAINLEVEL_OUTOF10: 3
PAINLEVEL_OUTOF10: 0
PAINLEVEL_OUTOF10: 9

## 2023-11-12 ASSESSMENT — PAIN DESCRIPTION - LOCATION
LOCATION: BACK;NECK
LOCATION: BACK
LOCATION: BACK;NECK
LOCATION: NECK

## 2023-11-12 ASSESSMENT — PAIN SCALES - WONG BAKER: WONGBAKER_NUMERICALRESPONSE: 2

## 2023-11-12 NOTE — CARE COORDINATION
Spoke with patient, choiced for [facility] homecare. Referral to [facility], orders for home PT placed. Patient will also need wheeled walker for home, no DME will deliver walker in the evening on a Sunday, patient feels he will need it to enter the home. Will need wheeled walker order. Updated bedside nurse. The Plan for Transition of Care is related to the following treatment goals: discharge planning when stable     The Patient and/or patient representative Rene Rider was provided with a choice of provider and agrees   with the discharge plan. [x] Yes [] No    Freedom of choice list was provided with basic dialogue that supports the patient's individualized plan of care/goals, treatment preferences and shares the quality data associated with the providers.  [x] Yes [] No

## 2023-11-12 NOTE — PLAN OF CARE
Problem: Safety - Adult  Goal: Free from fall injury  Outcome: Progressing     Problem: Pain  Goal: Verbalizes/displays adequate comfort level or baseline comfort level  Outcome: Progressing     Problem: ABCDS Injury Assessment  Goal: Absence of physical injury  Outcome: Progressing     Problem: Discharge Planning  Goal: Discharge to home or other facility with appropriate resources  Outcome: Progressing     Problem: Skin/Tissue Integrity  Goal: Absence of new skin breakdown  Description: 1. Monitor for areas of redness and/or skin breakdown  2. Assess vascular access sites hourly  3. Every 4-6 hours minimum:  Change oxygen saturation probe site  4. Every 4-6 hours:  If on nasal continuous positive airway pressure, respiratory therapy assess nares and determine need for appliance change or resting period.   Outcome: Progressing

## 2023-11-13 ENCOUNTER — TELEPHONE (OUTPATIENT)
Dept: NEUROSURGERY | Age: 83
End: 2023-11-13

## 2023-11-13 VITALS
HEART RATE: 101 BPM | DIASTOLIC BLOOD PRESSURE: 78 MMHG | TEMPERATURE: 99.5 F | OXYGEN SATURATION: 97 % | BODY MASS INDEX: 28.28 KG/M2 | RESPIRATION RATE: 18 BRPM | HEIGHT: 71 IN | SYSTOLIC BLOOD PRESSURE: 114 MMHG | WEIGHT: 202 LBS

## 2023-11-13 DIAGNOSIS — S12.590A OTHER CLOSED DISPLACED FRACTURE OF SIXTH CERVICAL VERTEBRA, INITIAL ENCOUNTER (HCC): Primary | ICD-10-CM

## 2023-11-13 PROCEDURE — 2580000003 HC RX 258: Performed by: STUDENT IN AN ORGANIZED HEALTH CARE EDUCATION/TRAINING PROGRAM

## 2023-11-13 PROCEDURE — 6370000000 HC RX 637 (ALT 250 FOR IP)

## 2023-11-13 PROCEDURE — 6370000000 HC RX 637 (ALT 250 FOR IP): Performed by: STUDENT IN AN ORGANIZED HEALTH CARE EDUCATION/TRAINING PROGRAM

## 2023-11-13 PROCEDURE — 6360000002 HC RX W HCPCS

## 2023-11-13 PROCEDURE — 99238 HOSP IP/OBS DSCHRG MGMT 30/<: CPT | Performed by: SURGERY

## 2023-11-13 RX ORDER — TIZANIDINE 4 MG/1
4 TABLET ORAL 3 TIMES DAILY PRN
Qty: 30 TABLET | Refills: 0 | Status: SHIPPED | OUTPATIENT
Start: 2023-11-13

## 2023-11-13 RX ADMIN — SODIUM CHLORIDE, PRESERVATIVE FREE 10 ML: 5 INJECTION INTRAVENOUS at 08:25

## 2023-11-13 RX ADMIN — ACETAMINOPHEN 650 MG: 325 TABLET ORAL at 03:05

## 2023-11-13 RX ADMIN — PANTOPRAZOLE SODIUM 40 MG: 40 TABLET, DELAYED RELEASE ORAL at 05:18

## 2023-11-13 RX ADMIN — MAGNESIUM HYDROXIDE 30 ML: 400 SUSPENSION ORAL at 08:24

## 2023-11-13 RX ADMIN — ACETAMINOPHEN 650 MG: 325 TABLET ORAL at 08:24

## 2023-11-13 RX ADMIN — METHOCARBAMOL 1000 MG: 500 TABLET ORAL at 08:24

## 2023-11-13 RX ADMIN — OXYCODONE HYDROCHLORIDE 10 MG: 10 TABLET ORAL at 12:28

## 2023-11-13 RX ADMIN — POLYETHYLENE GLYCOL 3350 17 G: 17 POWDER, FOR SOLUTION ORAL at 08:24

## 2023-11-13 RX ADMIN — HEPARIN SODIUM 5000 UNITS: 10000 INJECTION INTRAVENOUS; SUBCUTANEOUS at 05:18

## 2023-11-13 RX ADMIN — METHOCARBAMOL 1000 MG: 500 TABLET ORAL at 12:27

## 2023-11-13 ASSESSMENT — PAIN DESCRIPTION - LOCATION
LOCATION: NECK
LOCATION: BACK;NECK
LOCATION: NECK
LOCATION: NECK

## 2023-11-13 ASSESSMENT — PAIN SCALES - GENERAL
PAINLEVEL_OUTOF10: 7
PAINLEVEL_OUTOF10: 2
PAINLEVEL_OUTOF10: 2
PAINLEVEL_OUTOF10: 3
PAINLEVEL_OUTOF10: 5
PAINLEVEL_OUTOF10: 7

## 2023-11-13 ASSESSMENT — PAIN DESCRIPTION - ORIENTATION
ORIENTATION: POSTERIOR

## 2023-11-13 ASSESSMENT — PAIN DESCRIPTION - DESCRIPTORS
DESCRIPTORS: ACHING
DESCRIPTORS: ACHING;DISCOMFORT;SORE
DESCRIPTORS: ACHING;DULL;DISCOMFORT;SORE

## 2023-11-13 ASSESSMENT — PAIN - FUNCTIONAL ASSESSMENT
PAIN_FUNCTIONAL_ASSESSMENT: PREVENTS OR INTERFERES SOME ACTIVE ACTIVITIES AND ADLS
PAIN_FUNCTIONAL_ASSESSMENT: PREVENTS OR INTERFERES SOME ACTIVE ACTIVITIES AND ADLS

## 2023-11-13 ASSESSMENT — PAIN DESCRIPTION - ONSET
ONSET: ON-GOING
ONSET: ON-GOING

## 2023-11-13 ASSESSMENT — PAIN DESCRIPTION - PAIN TYPE
TYPE: SURGICAL PAIN
TYPE: SURGICAL PAIN

## 2023-11-13 ASSESSMENT — PAIN DESCRIPTION - FREQUENCY
FREQUENCY: CONTINUOUS
FREQUENCY: CONTINUOUS

## 2023-11-13 NOTE — TELEPHONE ENCOUNTER
Patients daughter called stating that the methocarbamol that was sent to the pharmacy from patients discharge was not covered by insurance and is $1000 even with a discount card. She was wondering if something else could be called in for him?

## 2023-11-13 NOTE — DISCHARGE SUMMARY
Physician Discharge Summary     Patient ID:  Liliane Valentine  [id number]  59 y.o.  1940    Admit date: 11/8/2023    Discharge date and time: 11/12/23    Admitting Physician: Mariluz Riggs MD     Admission Diagnoses: Injury of head, initial encounter [S09.90XA]  Fall from standing, initial encounter [W19. XXXA]  Closed displaced fracture of sixth cervical vertebra, unspecified fracture morphology, initial encounter [facility]) [S12.500A]    Discharge Diagnoses: Principal Problem:    Fall from standing, initial encounter  Active Problems:    C6 cervical fracture (HCC)    Acute blood loss anemia    Elevated LFTs    Thyroid nodule    Head injury  Resolved Problems:    * No resolved hospital problems. *      Admission Condition: good    Discharged Condition: stable    Indication for Admission: mechanical fall w cervical fracture    Hospital Course/Procedures/Operation/treatments:   11/8: Trauma consult, mechanical fall in shower. C6-7 fracture dislocation. Neurosurgery consulted  11/9: OR with neurosurgery today. No new injuries on tertiary exam  11/10: States pain is OK as long as he is not moving. In custom collar, drain serosanguinous. Remove iqbal, PT/OT today  11/11: No acute events overnight. Worked well with PT/OT. 11/12: Seen by PT/OT. 16/24 [facility]. Ok to discharge home w [address].   11/13: No acute issues. DC home today. Consults:   IP CONSULT TO TRAUMA SURGERY  IP CONSULT TO NEUROSURGERY  INPATIENT CONSULT TO ORTHOTIST/PROSTHETIST  IP CONSULT TO PHYSICAL MEDICINE REHAB    Significant Diagnostic Studies:   FLUORO FOR SURGICAL PROCEDURES    Result Date: 11/9/2023  EXAMINATION: SPOT FLUOROSCOPIC IMAGES 11/9/2023 1:38 pm TECHNIQUE: Fluoroscopy was provided by the radiology department for procedure. Radiologist was not present during examination. FLUOROSCOPY DOSE AND TYPE: Radiation Exposure Index: Kerma mGy, 2.32 mGy. Total fluoroscopic time: 4.23 seconds.   Total number of images: 2 CT Fluoroscopic sequence was
COMPARISON: None HISTORY: ORDERING SYSTEM PROVIDED HISTORY: cervical-thoracic fusion TECHNOLOGIST PROVIDED HISTORY: Reason for exam:->cervical-thoracic fusion What reading provider will be dictating this exam?->CRC Intraprocedural imaging. FINDINGS: Spot intraoperative images are obtained demonstrating there is an open surgical wound in the posterior midline. Pedicle screws are noted at T1 bilaterally without complication. Laminar fractures at C6 noted. .     Intraprocedural fluoroscopic spot images as above. See separate procedure report for more information. CT HEAD WO CONTRAST    Result Date: 11/8/2023  EXAMINATION: CT OF THE HEAD WITHOUT CONTRAST  11/8/2023 9:07 am TECHNIQUE: CT of the head was performed without the administration of intravenous contrast. Automated exposure control, iterative reconstruction, and/or weight based adjustment of the mA/kV was utilized to reduce the radiation dose to as low as reasonably achievable. COMPARISON: CT of the head from earlier today at 02:52 a.m.. HISTORY: ORDERING SYSTEM PROVIDED HISTORY: trauma TECHNOLOGIST PROVIDED HISTORY: Has a \"code stroke\" or \"stroke alert\" been called? ->No Reason for exam:->trauma What reading provider will be dictating this exam?->CRC FINDINGS: BRAIN/VENTRICLES: There is no acute intracranial hemorrhage, mass effect or midline shift. No abnormal extra-axial fluid collection. The gray-white differentiation is maintained without evidence of an acute infarct. There is no evidence of hydrocephalus. There is no change in moderate age-appropriate dilatation of the ventricles and sulci. ORBITS: The visualized portion of the orbits demonstrate no acute abnormality. SINUSES: The visualized paranasal sinuses and mastoid air cells demonstrate no acute abnormality. SOFT TISSUES/SKULL:  No acute abnormality of the visualized skull or soft tissues. There has been no interval change. No acute intracranial abnormality. No sign of closed head trauma.

## 2023-11-13 NOTE — CARE COORDINATION
CM note. D/c order noted. Pt was unable to discharge yesterday d/t being unable to obtain DME equipment. Referral was called to [facility]. Met with pt. D/c plan is home. He needs a wheeled walker. Has no preference for DME company. Orders obtained for [address] and [facility]. Referral called to [facility] with [facility] D[facility]. Notified Aden with [facility] of discharge today. Family will provide transport home. Amirah Terry [phone]. ARU was ordered on 10/11. Met with pt and his sister, who is a nurse at bedside. Went over therapy evals. Pt does not want to stay in [facility]. He wants to go home with Home care. VM left with [facility] to inform them that pt wants to go home. Amirah Terry [phone].

## 2023-11-14 ENCOUNTER — ENROLLMENT (OUTPATIENT)
Dept: CARE COORDINATION | Age: 83
End: 2023-11-14

## 2023-11-14 ENCOUNTER — CARE COORDINATION (OUTPATIENT)
Dept: CARE COORDINATION | Age: 83
End: 2023-11-14

## 2023-11-14 NOTE — CARE COORDINATION
Care Transitions Outreach Attempt    Call within 2 business days of discharge: Yes   Attempted to reach patient for transitions of care follow up. Neighbor answered phone and stated Pt was eating and to call back later. Patient: Alex Meza Patient : 1940 MRN: <[id number]>  Reason for Admission: SEYZ -23 Fall from Standing; C6 and C7 fracture with grade 2 subluxation of C6 on C7; S/P C4-T1 Fusion Posterior 23  Discharge Date: 23 RARS: Readmission Risk Score: 10.7    Last Discharge Facility       Date Complaint Diagnosis Description Type Department Provider    23 Fall Injury of head, initial encounter . .. ED to Hosp-Admission (Discharged) (ADMITTED)  8WE Jarrod Kemp MD; Justin Torres... Was this an external facility discharge?  No     Noted following upcoming appointments from discharge chart review:   [facility] follow up appointment(s):   Future Appointments   Date Time Provider [address]   2023  2:00 PM Zabrina Valle [facility]   2023  9:00 AM Phyllis BIANCHI [facility]   2024  1:00 PM HOMA Johnson [facility]     [facility]  follow up appointment(s):     Denisa Shaw LPN

## 2023-11-15 ENCOUNTER — CARE COORDINATION (OUTPATIENT)
Dept: CARE COORDINATION | Age: 83
End: 2023-11-15

## 2023-11-15 NOTE — CARE COORDINATION
11/22/23  Obtained and reviewed discharge summary and/or continuity of care documents    Offered patient enrollment in the Remote Patient Monitoring (RPM) program for in-home monitoring: Patient is not eligible for RPM program. [facility]    Care Transitions 24 Hour Call    Schedule Follow Up Appointment with PCP: Completed  Do you have a copy of your discharge instructions?: Yes  Do you have all of your prescriptions and are they filled?: Yes  Have you been contacted by a [facility] Pharmacist?: No  Have you scheduled your follow up appointment?: Yes  How are you going to get to your appointment?: Car - family or friend to transport  Do you have support at home?: Partner/Spouse/SO  Do you feel like you have everything you need to keep you well at home?: Yes  Are you an active caregiver in your home?: Yes  Care Transitions Interventions  No Identified Needs                                 Discussed follow-up appointments. If no appointment was previously scheduled, appointment scheduling offered: Yes. Is follow up appointment scheduled within 7 days of discharge? Yes. Follow Up  Future Appointments   Date Time Provider [address]   11/22/2023  2:00 PM Darlene [facility]   12/7/2023  9:00 AM Torri BIANCHI [facility]   2/1/2024  1:00 PM Darlene [facility]       Care Transition Nurse provided contact information. No further follow-up call indicated based on severity of symptoms and risk factors.     Ascencion Chaudhry LPN

## 2023-11-22 ENCOUNTER — OFFICE VISIT (OUTPATIENT)
Dept: NEUROSURGERY | Age: 83
End: 2023-11-22
Payer: MEDICARE

## 2023-11-22 DIAGNOSIS — Z98.1 S/P CERVICAL SPINAL FUSION: Primary | ICD-10-CM

## 2023-11-22 PROCEDURE — 99212 OFFICE O/P EST SF 10 MIN: CPT

## 2023-11-22 PROCEDURE — 99024 POSTOP FOLLOW-UP VISIT: CPT | Performed by: STUDENT IN AN ORGANIZED HEALTH CARE EDUCATION/TRAINING PROGRAM

## 2023-11-22 NOTE — PROGRESS NOTES
Encounter for Staple Removal     Nikunj Noland is a 82 y.o.  male  two weeks s/p C4-T1 fusion      Patient presents for staple removal.      Equipment: General staple removal kit, ChloraPrep, sterile gloves     Procedure: Pt was placed in the sitting position. Using a sterile technique, ChloraPrep was used to clean the incisional wound. The wound healing well without signs of infection. Staples were removed with no pain and no complications. Pt tolerated procedure well. Pts questions were answered and wound care instructions and restrictions were discussed. Pt is to return to neurosurgery clinic in 2 weeks for surgery follow-up or sooner if new issues or concerns arise.

## 2023-12-06 DIAGNOSIS — Z98.1 S/P CERVICAL SPINAL FUSION: Primary | ICD-10-CM

## 2023-12-07 ENCOUNTER — HOSPITAL ENCOUNTER (OUTPATIENT)
Age: 83
Discharge: HOME | End: 2023-12-09
Payer: MEDICARE

## 2023-12-07 ENCOUNTER — HOSPITAL ENCOUNTER (OUTPATIENT)
Dept: GENERAL RADIOLOGY | Age: 83
Discharge: HOME | End: 2023-12-09
Payer: MEDICARE

## 2023-12-07 ENCOUNTER — OFFICE VISIT (OUTPATIENT)
Dept: NEUROSURGERY | Age: 83
End: 2023-12-07
Payer: MEDICARE

## 2023-12-07 VITALS
WEIGHT: 202 LBS | BODY MASS INDEX: 28.28 KG/M2 | DIASTOLIC BLOOD PRESSURE: 76 MMHG | SYSTOLIC BLOOD PRESSURE: 140 MMHG | HEIGHT: 71 IN | OXYGEN SATURATION: 97 % | HEART RATE: 76 BPM

## 2023-12-07 DIAGNOSIS — S12.590D OTHER CLOSED DISPLACED FRACTURE OF SIXTH CERVICAL VERTEBRA WITH ROUTINE HEALING, SUBSEQUENT ENCOUNTER: Primary | ICD-10-CM

## 2023-12-07 DIAGNOSIS — Z98.1 S/P CERVICAL SPINAL FUSION: ICD-10-CM

## 2023-12-07 PROCEDURE — 99212 OFFICE O/P EST SF 10 MIN: CPT

## 2023-12-07 PROCEDURE — 72040 X-RAY EXAM NECK SPINE 2-3 VW: CPT

## 2023-12-07 PROCEDURE — 99024 POSTOP FOLLOW-UP VISIT: CPT | Performed by: PHYSICIAN ASSISTANT

## 2023-12-07 NOTE — PROGRESS NOTES
Post Operative Follow-up     This is an 82year old male who presents to the office for a 1 month follow-up s/p posterior C4-T1 fusion     Subjective: Patient states he has been doing great. Denies neck pain or arm pain. Weaned off pain medications. Collar compliance. Denies new complaints. No issues with incision site. Cervical XR reviewed     Physical Exam:              WDWN, no apparent distress              Non-labored breathing               Vitals Stable              Alert and oriented x3              CN 3-12 intact              PERRL              EOMI              LEDEZMA well              Motor strength symmetric              Sensation to LT intact bilaterally   Incision healing well with no signs of infection                 Imaging: Cervical XR 12/7: Stable alignment, stable fusion. No acute abnormalities noted. Final report pending. Assessment: This is a 82 y.o.  male presenting for a 1 month follow-up s/p posterior C4-T1 fusion. Stable. Plan:  -Pain control and expectations discussed  -Continue collar while ambulating  -Okay to lift up to 20-25lbs  -Okay to drive  -Patient would like to have his knee replaced. Okay to have knee replacement as long as they can keep cervical spine neutral while intubating   -[facility] report reviewed   -Follow-up in neurosurgery clinic in 2 months for 3 month follow up with XR  -Call or return to neurosurgery office sooner if symptoms worsen or if new issues arise in the interim.     Electronically signed by Joseph Vasquez PA-C on 12/7/2023 at 9:46 AM

## 2024-02-01 ENCOUNTER — HOSPITAL ENCOUNTER (OUTPATIENT)
Dept: GENERAL RADIOLOGY | Age: 84
Discharge: HOME | End: 2024-02-03
Payer: MEDICARE

## 2024-02-01 ENCOUNTER — HOSPITAL ENCOUNTER (OUTPATIENT)
Age: 84
Discharge: HOME | End: 2024-02-03
Payer: MEDICARE

## 2024-02-01 ENCOUNTER — OFFICE VISIT (OUTPATIENT)
Dept: NEUROSURGERY | Age: 84
End: 2024-02-01
Payer: MEDICARE

## 2024-02-01 DIAGNOSIS — S12.590D OTHER CLOSED DISPLACED FRACTURE OF SIXTH CERVICAL VERTEBRA WITH ROUTINE HEALING, SUBSEQUENT ENCOUNTER: ICD-10-CM

## 2024-02-01 DIAGNOSIS — Z98.1 S/P CERVICAL SPINAL FUSION: ICD-10-CM

## 2024-02-01 DIAGNOSIS — S12.590D OTHER CLOSED DISPLACED FRACTURE OF SIXTH CERVICAL VERTEBRA WITH ROUTINE HEALING, SUBSEQUENT ENCOUNTER: Primary | ICD-10-CM

## 2024-02-01 PROCEDURE — 99024 POSTOP FOLLOW-UP VISIT: CPT | Performed by: PHYSICIAN ASSISTANT

## 2024-02-01 PROCEDURE — 72040 X-RAY EXAM NECK SPINE 2-3 VW: CPT

## 2024-02-01 PROCEDURE — 99212 OFFICE O/P EST SF 10 MIN: CPT

## 2024-02-01 NOTE — PROGRESS NOTES
Post Operative Follow-up     This is an 83 year old male who presents to the office for a 3 month follow-up s/p posterior C4-T1 fusion     Subjective: Patient states he feels well. Denies neck pain or arm pain. Biggest complaint is his left knee pain for which he follows with orthopedics. Patient does admit he does not wear his collar as much as he should. Denies new complaints. No issues with incision site. Cervical XR reviewed     Physical Exam:              WDWN, no apparent distress              Non-labored breathing               Vitals Stable              Alert and oriented x3              CN 3-12 intact              PERRL              EOMI              LEDEZMA well              Motor strength symmetric              Sensation to LT intact bilaterally   Incision healing well with no signs of infection                 Imaging: Cervical XR 2/1/24: Stable alignment, stable fusion. No acute abnormalities noted. Final report pending.       Assessment: This is a 83 y.o.  male presenting for a 3 month follow-up s/p posterior C4-T1 fusion. Stable.      Plan:  -XR reviewed  -No restrictions. Okay to d/c collar completely  -Referral given for PT  -Clearance for TKA per neurosurgery POV  -[facility] report reviewed   -Follow-up in neurosurgery clinic in 9 months for 1 year follow up with XR  -Call or return to neurosurgery office sooner if symptoms worsen or if new issues arise in the interim.    Electronically signed by Alysia Diego PA-C on 2/1/2024 at 1:13 PM

## 2024-11-21 ENCOUNTER — HOSPITAL ENCOUNTER (OUTPATIENT)
Age: 84
Discharge: HOME | End: 2024-11-23
Payer: MEDICARE

## 2024-11-21 ENCOUNTER — OFFICE VISIT (OUTPATIENT)
Dept: NEUROSURGERY | Age: 84
End: 2024-11-21
Payer: MEDICARE

## 2024-11-21 ENCOUNTER — HOSPITAL ENCOUNTER (OUTPATIENT)
Dept: GENERAL RADIOLOGY | Age: 84
Discharge: HOME | End: 2024-11-23
Payer: MEDICARE

## 2024-11-21 VITALS
HEIGHT: 71 IN | OXYGEN SATURATION: 98 % | BODY MASS INDEX: 28.28 KG/M2 | HEART RATE: 69 BPM | SYSTOLIC BLOOD PRESSURE: 151 MMHG | WEIGHT: 202 LBS | TEMPERATURE: 97.8 F | DIASTOLIC BLOOD PRESSURE: 76 MMHG

## 2024-11-21 DIAGNOSIS — Z98.1 S/P CERVICAL SPINAL FUSION: ICD-10-CM

## 2024-11-21 DIAGNOSIS — S12.590D OTHER CLOSED DISPLACED FRACTURE OF SIXTH CERVICAL VERTEBRA WITH ROUTINE HEALING, SUBSEQUENT ENCOUNTER: Primary | ICD-10-CM

## 2024-11-21 DIAGNOSIS — S12.590D OTHER CLOSED DISPLACED FRACTURE OF SIXTH CERVICAL VERTEBRA WITH ROUTINE HEALING, SUBSEQUENT ENCOUNTER: ICD-10-CM

## 2024-11-21 PROCEDURE — 72040 X-RAY EXAM NECK SPINE 2-3 VW: CPT

## 2024-11-21 NOTE — PROGRESS NOTES
Post Operative Follow-up     This is an 84 year old male who presents to the office for a 1 year follow-up s/p posterior C4-T1 fusion     Subjective: Patient states he has been feeling well. Admits to intermittent \"cracking\" when turning his head but denies pain. Cervical XR reviewed     Physical Exam:              WDWN, no apparent distress              Non-labored breathing               Vitals Stable              Alert and oriented x3              CN 3-12 intact              PERRL              EOMI              LEDEZMA well              Motor strength symmetric              Sensation to LT intact bilaterally   Incision healing well with no signs of infection                 Imaging: Cervical XR 11/21/24: Stable alignment, stable fusion. No acute abnormalities noted. Final report pending.       Assessment: This is a 84 y.o.  male presenting for a 1 year follow-up s/p posterior C4-T1 fusion. Stable.      Plan:  -XR reviewed. Stable. Patient doing well.   -[facility] report reviewed   -Follow-up in neurosurgery clinic PRN  -Call or return to neurosurgery office sooner if symptoms worsen or if new issues arise in the interim.    Electronically signed by Alysia Diego PA-C on 11/21/2024 at 1:11 PM

## (undated) DEVICE — PREMIUM WET SKIN PREP TRAY: Brand: MEDLINE INDUSTRIES, INC.

## (undated) DEVICE — DRAPE,REIN 53X77,STERILE: Brand: MEDLINE

## (undated) DEVICE — CATHETER URETH 14FR BLLN 5CC SIL HYDRGEL 2 W F SPEC TIEM M

## (undated) DEVICE — 3M™ IOBAN™ 2 ANTIMICROBIAL INCISE DRAPE 6640EZ: Brand: IOBAN™ 2

## (undated) DEVICE — INTENDED FOR TISSUE SEPARATION, AND OTHER PROCEDURES THAT REQUIRE A SHARP SURGICAL BLADE TO PUNCTURE OR CUT.: Brand: BARD-PARKER ® STAINLESS STEEL BLADES

## (undated) DEVICE — JACKSON TABLE POSITIONER KIT: Brand: MEDLINE INDUSTRIES, INC.

## (undated) DEVICE — DRILL BIT FOR 3.5MM SCREW

## (undated) DEVICE — GLOVE ORANGE PI 7   MSG9070

## (undated) DEVICE — ELECTRODE PT RET AD L9FT HI MOIST COND ADH HYDRGEL CORDED

## (undated) DEVICE — PATIENT RETURN ELECTRODE, SINGLE-USE, CONTACT QUALITY MONITORING, ADULT, WITH 9FT CORD, FOR PATIENTS WEIGING OVER 33LBS. (15KG): Brand: MEGADYNE

## (undated) DEVICE — GOWN,AURORA,BRTHSLV,2XL,18/CS: Brand: MEDLINE

## (undated) DEVICE — LIQUIBAND RAPID ADHESIVE 36/CS 0.8ML: Brand: MEDLINE

## (undated) DEVICE — GOWN,BREATHABLE SLV,AURORA,XLG,STRL: Brand: MEDLINE

## (undated) DEVICE — SURGICAL PROCEDURE PACK HND

## (undated) DEVICE — SPONGE,DISSECTOR,K,XRAY,9/16"X1/4",STRL: Brand: MEDLINE

## (undated) DEVICE — DOUBLE BASIN SET: Brand: MEDLINE INDUSTRIES, INC.

## (undated) DEVICE — BLADE ES L6IN ELASTOMERIC COAT EXT DURABLE BEND UPTO 90DEG

## (undated) DEVICE — E-Z CLEAN, NON-STICK, PTFE COATED, ELECTROSURGICAL BLADE ELECTRODE, MODIFIED EXTENDED INSULATION, 2.5 INCH (6.35 CM): Brand: MEGADYNE

## (undated) DEVICE — GLOVE SURG 8.5 PF POLYMER WHT STRL SIGN LTX ESSENTIAL LTX

## (undated) DEVICE — HYPODERMIC SAFETY NEEDLE: Brand: MAGELLAN

## (undated) DEVICE — 3M™ STERI-DRAPE™ U-DRAPE 1015: Brand: STERI-DRAPE™

## (undated) DEVICE — SPONGE,DISSECTOR,ROUND CHERRY,XR,ST,5/PK: Brand: MEDLINE

## (undated) DEVICE — TUBING SUCT 12FR MAL ALUM SHFT FN CAP VENT UNIV CONN W/ OBT

## (undated) DEVICE — GLOVE SURG SZ 65 THK91MIL LTX FREE SYN POLYISOPRENE

## (undated) DEVICE — GOWN,SIRUS,FABRNF,XL,20/CS: Brand: MEDLINE

## (undated) DEVICE — Device

## (undated) DEVICE — BANDAGE,GAUZE,4.5"X4.1YD,STERILE,LF: Brand: MEDLINE

## (undated) DEVICE — 3M(TM) MEDIPORE(TM) +PAD SOFT CLOTH ADHESIVE WOUND DRESSING 3569: Brand: 3M™ MEDIPORE™

## (undated) DEVICE — KIT EVAC 400CC DIA1/8IN H PAT 12.5IN 3 SPR RND SHP PVC DRN

## (undated) DEVICE — PENCIL ES L3M BTTN SWCH HOLSTER W/ BLDE ELECTRD EDGE

## (undated) DEVICE — GLOVE ORTHO 8   MSG9480

## (undated) DEVICE — SYRINGE 20ML LL S/C 50

## (undated) DEVICE — TUBING, SUCTION, 3/16" X 12', STRAIGHT: Brand: MEDLINE

## (undated) DEVICE — SWABSTCK, BENZOIN TINCTURE, 1/PK, STRL: Brand: APLICARE

## (undated) DEVICE — BOWL ASSY BM210 DUAL BLADE DISPOSABLE: Brand: MIDAS REX™

## (undated) DEVICE — MARKER SURG PASS SPHR NDI

## (undated) DEVICE — CLOTH SURG PREP PREOPERATIVE CHLORHEXIDINE GLUC 2% READYPREP

## (undated) DEVICE — GLOVE SURG SZ 8 L12IN FNGR THK79MIL GRN LTX FREE

## (undated) DEVICE — COLLAR CERV REG AD 2 PC ATLS

## (undated) DEVICE — GOWN,BREATHABLE,IMP SLV 3XL AURORA: Brand: MEDLINE

## (undated) DEVICE — LUMBAR LAMINECTOMY: Brand: MEDLINE INDUSTRIES, INC.

## (undated) DEVICE — GLOVE SURG SZ 85 L12IN FNGR THK79MIL GRN LTX FREE

## (undated) DEVICE — ZIMMER® STERILE DISPOSABLE TOURNIQUET CUFF WITH PROTECTIVE SLEEVE AND PLC, DUAL PORT, SINGLE BLADDER, 18 IN. (46 CM)

## (undated) DEVICE — 3.0MM PRECISION NEURO (MATCH HEAD)

## (undated) DEVICE — TOWEL,OR,DSP,ST,BLUE,DLX,10/PK,8PK/CS: Brand: MEDLINE

## (undated) DEVICE — GLOVE ORANGE PI 8   MSG9080